# Patient Record
Sex: MALE | Race: ASIAN | NOT HISPANIC OR LATINO | Employment: OTHER | URBAN - METROPOLITAN AREA
[De-identification: names, ages, dates, MRNs, and addresses within clinical notes are randomized per-mention and may not be internally consistent; named-entity substitution may affect disease eponyms.]

---

## 2017-04-17 ENCOUNTER — HOSPITAL ENCOUNTER (OUTPATIENT)
Dept: LAB | Facility: MEDICAL CENTER | Age: 77
End: 2017-04-17
Attending: INTERNAL MEDICINE
Payer: MEDICARE

## 2017-04-17 ENCOUNTER — TELEPHONE (OUTPATIENT)
Dept: MEDICAL GROUP | Facility: MEDICAL CENTER | Age: 77
End: 2017-04-17

## 2017-04-17 ENCOUNTER — OFFICE VISIT (OUTPATIENT)
Dept: MEDICAL GROUP | Facility: MEDICAL CENTER | Age: 77
End: 2017-04-17
Payer: MEDICARE

## 2017-04-17 VITALS
SYSTOLIC BLOOD PRESSURE: 120 MMHG | OXYGEN SATURATION: 97 % | HEART RATE: 80 BPM | HEIGHT: 63 IN | DIASTOLIC BLOOD PRESSURE: 70 MMHG | WEIGHT: 149 LBS | TEMPERATURE: 97.7 F | BODY MASS INDEX: 26.4 KG/M2 | RESPIRATION RATE: 16 BRPM

## 2017-04-17 DIAGNOSIS — Z12.5 PROSTATE CANCER SCREENING: ICD-10-CM

## 2017-04-17 DIAGNOSIS — D64.9 ANEMIA, UNSPECIFIED TYPE: Chronic | ICD-10-CM

## 2017-04-17 DIAGNOSIS — E83.119 HEMOCHROMATOSIS, UNSPECIFIED HEMOCHROMATOSIS TYPE: ICD-10-CM

## 2017-04-17 DIAGNOSIS — E78.5 DYSLIPIDEMIA: Chronic | ICD-10-CM

## 2017-04-17 DIAGNOSIS — E11.9 DIABETES MELLITUS TYPE 2, DIET-CONTROLLED (HCC): Chronic | ICD-10-CM

## 2017-04-17 DIAGNOSIS — E83.19 IRON OVERLOAD: ICD-10-CM

## 2017-04-17 LAB
ALBUMIN SERPL BCP-MCNC: 4.3 G/DL (ref 3.2–4.9)
ALBUMIN/GLOB SERPL: 1.5 G/DL
ALP SERPL-CCNC: 65 U/L (ref 30–99)
ALT SERPL-CCNC: 17 U/L (ref 2–50)
ANION GAP SERPL CALC-SCNC: 6 MMOL/L (ref 0–11.9)
APPEARANCE UR: CLEAR
AST SERPL-CCNC: 18 U/L (ref 12–45)
BASOPHILS # BLD AUTO: 1.3 % (ref 0–1.8)
BASOPHILS # BLD: 0.06 K/UL (ref 0–0.12)
BILIRUB SERPL-MCNC: 1.6 MG/DL (ref 0.1–1.5)
BILIRUB UR QL STRIP.AUTO: NEGATIVE
BUN SERPL-MCNC: 20 MG/DL (ref 8–22)
CALCIUM SERPL-MCNC: 9.2 MG/DL (ref 8.5–10.5)
CHLORIDE SERPL-SCNC: 105 MMOL/L (ref 96–112)
CHOLEST SERPL-MCNC: 134 MG/DL (ref 100–199)
CO2 SERPL-SCNC: 29 MMOL/L (ref 20–33)
COLOR UR: NORMAL
CREAT SERPL-MCNC: 0.9 MG/DL (ref 0.5–1.4)
CREAT UR-MCNC: 68.5 MG/DL
CULTURE IF INDICATED INDCX: NO UA CULTURE
EOSINOPHIL # BLD AUTO: 0.07 K/UL (ref 0–0.51)
EOSINOPHIL NFR BLD: 1.5 % (ref 0–6.9)
ERYTHROCYTE [DISTWIDTH] IN BLOOD BY AUTOMATED COUNT: 40.9 FL (ref 35.9–50)
EST. AVERAGE GLUCOSE BLD GHB EST-MCNC: 160 MG/DL
FERRITIN SERPL-MCNC: 1441.8 NG/ML (ref 22–322)
FOLATE SERPL-MCNC: 10.8 NG/ML
GFR SERPL CREATININE-BSD FRML MDRD: >60 ML/MIN/1.73 M 2
GLOBULIN SER CALC-MCNC: 2.9 G/DL (ref 1.9–3.5)
GLUCOSE SERPL-MCNC: 179 MG/DL (ref 65–99)
GLUCOSE UR STRIP.AUTO-MCNC: NEGATIVE MG/DL
HBA1C MFR BLD: 7.2 % (ref 0–5.6)
HCT VFR BLD AUTO: 35.1 % (ref 42–52)
HDLC SERPL-MCNC: 60 MG/DL
HGB BLD-MCNC: 11.3 G/DL (ref 14–18)
IMM GRANULOCYTES # BLD AUTO: 0.01 K/UL (ref 0–0.11)
IMM GRANULOCYTES NFR BLD AUTO: 0.2 % (ref 0–0.9)
IRON SERPL-MCNC: 222 UG/DL (ref 50–180)
KETONES UR STRIP.AUTO-MCNC: NEGATIVE MG/DL
LDH SERPL-CCNC: 182 U/L (ref 107–266)
LDLC SERPL CALC-MCNC: 62 MG/DL
LEUKOCYTE ESTERASE UR QL STRIP.AUTO: NEGATIVE
LYMPHOCYTES # BLD AUTO: 1.69 K/UL (ref 1–4.8)
LYMPHOCYTES NFR BLD: 36.8 % (ref 22–41)
MCH RBC QN AUTO: 24 PG (ref 27–33)
MCHC RBC AUTO-ENTMCNC: 32.2 G/DL (ref 33.7–35.3)
MCV RBC AUTO: 74.5 FL (ref 81.4–97.8)
MICRO URNS: NORMAL
MICROALBUMIN UR-MCNC: 3.5 MG/DL
MICROALBUMIN/CREAT UR: 51 MG/G (ref 0–30)
MONOCYTES # BLD AUTO: 0.3 K/UL (ref 0–0.85)
MONOCYTES NFR BLD AUTO: 6.5 % (ref 0–13.4)
NEUTROPHILS # BLD AUTO: 2.46 K/UL (ref 1.82–7.42)
NEUTROPHILS NFR BLD: 53.7 % (ref 44–72)
NITRITE UR QL STRIP.AUTO: NEGATIVE
NRBC # BLD AUTO: 0 K/UL
NRBC BLD AUTO-RTO: 0 /100 WBC
PH UR STRIP.AUTO: 6 [PH]
PLATELET # BLD AUTO: 212 K/UL (ref 164–446)
PMV BLD AUTO: 10.7 FL (ref 9–12.9)
POTASSIUM SERPL-SCNC: 4.3 MMOL/L (ref 3.6–5.5)
PROT SERPL-MCNC: 7.2 G/DL (ref 6–8.2)
PROT UR QL STRIP: NEGATIVE MG/DL
PSA SERPL-MCNC: 1.66 NG/ML (ref 0–4)
RBC # BLD AUTO: 4.71 M/UL (ref 4.7–6.1)
RBC UR QL AUTO: NEGATIVE
SODIUM SERPL-SCNC: 140 MMOL/L (ref 135–145)
SP GR UR STRIP.AUTO: 1.01
TRIGL SERPL-MCNC: 61 MG/DL (ref 0–149)
TSH SERPL DL<=0.005 MIU/L-ACNC: 1.03 UIU/ML (ref 0.3–3.7)
VIT B12 SERPL-MCNC: 362 PG/ML (ref 211–911)
WBC # BLD AUTO: 4.6 K/UL (ref 4.8–10.8)

## 2017-04-17 PROCEDURE — 82728 ASSAY OF FERRITIN: CPT

## 2017-04-17 PROCEDURE — 83540 ASSAY OF IRON: CPT

## 2017-04-17 PROCEDURE — 82043 UR ALBUMIN QUANTITATIVE: CPT

## 2017-04-17 PROCEDURE — 83036 HEMOGLOBIN GLYCOSYLATED A1C: CPT

## 2017-04-17 PROCEDURE — 36415 COLL VENOUS BLD VENIPUNCTURE: CPT

## 2017-04-17 PROCEDURE — G8432 DEP SCR NOT DOC, RNG: HCPCS | Performed by: INTERNAL MEDICINE

## 2017-04-17 PROCEDURE — 1036F TOBACCO NON-USER: CPT | Performed by: INTERNAL MEDICINE

## 2017-04-17 PROCEDURE — 85025 COMPLETE CBC W/AUTO DIFF WBC: CPT

## 2017-04-17 PROCEDURE — 84153 ASSAY OF PSA TOTAL: CPT

## 2017-04-17 PROCEDURE — 82607 VITAMIN B-12: CPT

## 2017-04-17 PROCEDURE — 84443 ASSAY THYROID STIM HORMONE: CPT

## 2017-04-17 PROCEDURE — 99214 OFFICE O/P EST MOD 30 MIN: CPT | Performed by: INTERNAL MEDICINE

## 2017-04-17 PROCEDURE — 84165 PROTEIN E-PHORESIS SERUM: CPT

## 2017-04-17 PROCEDURE — 82570 ASSAY OF URINE CREATININE: CPT

## 2017-04-17 PROCEDURE — 80061 LIPID PANEL: CPT

## 2017-04-17 PROCEDURE — 83615 LACTATE (LD) (LDH) ENZYME: CPT

## 2017-04-17 PROCEDURE — G8417 CALC BMI ABV UP PARAM F/U: HCPCS | Performed by: INTERNAL MEDICINE

## 2017-04-17 PROCEDURE — 80053 COMPREHEN METABOLIC PANEL: CPT

## 2017-04-17 PROCEDURE — 81003 URINALYSIS AUTO W/O SCOPE: CPT

## 2017-04-17 PROCEDURE — 4040F PNEUMOC VAC/ADMIN/RCVD: CPT | Performed by: INTERNAL MEDICINE

## 2017-04-17 PROCEDURE — 1101F PT FALLS ASSESS-DOCD LE1/YR: CPT | Performed by: INTERNAL MEDICINE

## 2017-04-17 PROCEDURE — 84160 ASSAY OF PROTEIN ANY SOURCE: CPT

## 2017-04-17 PROCEDURE — 82746 ASSAY OF FOLIC ACID SERUM: CPT

## 2017-04-17 RX ORDER — PRAVASTATIN SODIUM 20 MG
10 TABLET ORAL DAILY
Qty: 90 TAB | Refills: 1 | Status: SHIPPED | OUTPATIENT
Start: 2017-04-17 | End: 2017-10-23

## 2017-04-17 NOTE — MR AVS SNAPSHOT
"        Jane Valentin   2017 9:20 AM   Office Visit   MRN: [id number]    Department:  [facility]   Dept Phone:  [phone]    Description:  Male : 1940   Provider:  Thomas Greenberg M.D.           Allergies as of 2017     Allergen Noted Reactions    Nkda [No Known Drug Allergy] 2010         You were diagnosed with     Diabetes mellitus type 2, diet-controlled (CMS-HCC)   [921220]       Dyslipidemia   [452328]       Anemia, unspecified type   [0440976]       Prostate cancer screening   [189209]       Iron overload   [565144]       Hemochromatosis, unspecified hemochromatosis type   [2822844]         Vital Signs     Blood Pressure Pulse Temperature Respirations Height Weight    120/70 mmHg 80 36.5 °C (97.7 °F) 16 1.6 m (5' 2.99\") 67.586 kg (149 lb)    Body Mass Index Oxygen Saturation Smoking Status             26.40 kg/m2 97% Former Smoker         Basic Information     Date Of Birth Sex Race Ethnicity Preferred Language    1940 Male  Non- English      Your appointments     Oct 23, 2017 11:00 AM   ANNUAL EXAM PREVENTATIVE with Thomas Greenberg M.D.   [facility])    [address]  [facility] [id number] [phone]              Problem List              ICD-10-CM Priority Class Noted - Resolved    Dyslipidemia (Chronic) E78.5 Low  6/3/2009 - Present    Anemia (Chronic) D64.9 Medium  6/3/2009 - Present    Preventative health care (Chronic) Z00.00   6/3/2009 - Present    Iron overload (Chronic)    2009 - Present    Diabetes mellitus type 2, diet-controlled (CMS-HCC) (Chronic) E11.9 Low  2009 - Present    BPH (benign prostatic hypertrophy) (Chronic) N40.0 Low  2011 - Present    MEDICAL HOME    Unknown - Present    Adenomatous colon polyp (Chronic) D12.6   2013 - Present    Arthritis of knee M19.90   2014 - Present    History of tobacco abuse Z87.891   2016 - Present      Health Maintenance    "       Date Due Completion Dates    URINE ACR / MICROALBUMIN 4/27/2016 4/27/2015, 1/27/2014    DIABETES MONOFILAMENT / LE EXAM 1/11/2017 1/11/2016, 2/26/2015 (Done), 1/27/2014 (Done)    Override on 2/26/2015: Done    Override on 1/27/2014: Done    A1C SCREENING 4/10/2017 10/10/2016, 6/6/2016, 1/11/2016, 10/26/2015, 4/27/2015, 11/18/2014, 6/2/2014, 1/27/2014, 4/1/2013, 10/29/2012, 7/23/2012, 4/16/2012, 7/26/2010    FASTING LIPID PROFILE 6/6/2017 6/6/2016, 1/11/2016, 10/26/2015, 4/27/2015, 1/27/2014, 10/29/2012, 7/26/2010, 6/4/2009    SERUM CREATININE 10/10/2017 10/10/2016, 9/10/2016, 9/9/2016, 6/6/2016, 1/11/2016, 10/26/2015, 4/27/2015, 11/18/2014, 7/30/2014, 7/29/2014, 7/21/2014, 6/2/2014, 1/27/2014, 4/1/2013, 10/29/2012, 7/23/2012, 4/16/2012, 9/12/2011, 6/20/2011, 11/23/2010, 7/26/2010, 8/25/2009, 6/4/2009    RETINAL SCREENING 12/5/2017 12/5/2016, 11/19/2014    COLONOSCOPY 6/25/2018 6/25/2015, 11/19/2014, 7/2/2014    IMM DTaP/Tdap/Td Vaccine (2 - Td) 7/23/2022 7/23/2012            Current Immunizations     13-VALENT PCV PREVNAR 10/28/2015    Hepatitis B Vaccine Recombivax (Adol/Adult) 10/10/2016    Influenza TIV (IM) 10/29/2012 11:44 AM    Influenza Vaccine Adult HD 10/10/2016, 10/26/2015, 11/17/2014, 1/27/2014    Influenza Vaccine Pediatric 11/22/2010    Pneumococcal polysaccharide vaccine (PPSV-23) 4/1/2013    SHINGLES VACCINE 10/29/2012 11:45 AM    Tdap Vaccine 7/23/2012      Below and/or attached are the medications your provider expects you to take. Review all of your home medications and newly ordered medications with your provider and/or pharmacist. Follow medication instructions as directed by your provider and/or pharmacist. Please keep your medication list with you and share with your provider. Update the information when medications are discontinued, doses are changed, or new medications (including over-the-counter products) are added; and carry medication information at all times in the event of  emergency situations     Allergies:  NKDA - (reactions not documented)               Medications  Valid as of: April 17, 2017 -  9:36 AM    Generic Name Brand Name Tablet Size Instructions for use    Aspirin (Chew Tab) ASA 81 MG Take 1 Tab by mouth every day.        MetFORMIN HCl (Tab) GLUCOPHAGE 500 MG Take 1 Tab by mouth every day.        Pravastatin Sodium (Tab) PRAVACHOL 10 MG Take 1 Tab by mouth every day.        Pravastatin Sodium (Tab) PRAVACHOL 20 MG Take 0.5 Tabs by mouth every day.        .                 Medicines prescribed today were sent to:     [id number] - [place], [place] - [address]    [address] [place] [address]    Phone: [phone] Fax: [phone]    Open 24 Hours?: No      Medication refill instructions:       If your prescription bottle indicates you have medication refills left, it is not necessary to call your provider’s office. Please contact your pharmacy and they will refill your medication.    If your prescription bottle indicates you do not have any refills left, you may request refills at any time through one of the following ways: The online [facility] system (except Urgent Care), by calling your provider’s office, or by asking your pharmacy to contact your provider’s office with a refill request. Medication refills are processed only during regular business hours and may not be available until the next business day. Your provider may request additional information or to have a follow-up visit with you prior to refilling your medication.   *Please Note: Medication refills are assigned a new Rx number when refilled electronically. Your pharmacy may indicate that no refills were authorized even though a new prescription for the same medication is available at the pharmacy. Please request the medicine by name with the pharmacy before contacting your provider for a refill.        Your To Do List     Future Labs/Procedures Complete By Expires    CBC WITH DIFFERENTIAL  As  directed 4/18/2018    COMP METABOLIC PANEL  As directed 4/18/2018    FERRITIN  As directed 4/18/2018    FOLATE  As directed 4/18/2018    HEMOGLOBIN A1C  As directed 4/18/2018    IRON  As directed 4/18/2018    LDH  As directed 4/18/2018    LIPID PROFILE  As directed 4/18/2018    MICROALBUMIN CREAT RATIO URINE  As directed 4/18/2018    PROSTATE SPECIFIC AG SCREENING  As directed 4/18/2018    SERUM PROTEIN ELECTROPHORESIS  As directed 4/18/2018    TSH  As directed 4/18/2018    URINALYSIS,CULTURE IF INDICATED  As directed 4/18/2018    VITAMIN B12  As directed 4/18/2018      Other Notes About Your Plan     Wife minor hinton ok to discuss all med issues with her  Declines phlebotomy,liver biopsy for poss hemochromatosis    11/16 repeat cbc,anemia workup,a1c and diabetes labs  Hep b second in series                     MyChart Access Code: Activation code not generated  Current MyChart Status: Active

## 2017-04-17 NOTE — PROGRESS NOTES
Subjective:      Jane Valentin is a 76 y.o. male who presents with  Follow up sugar        HPI     Patient history of diabetes, on metformin 500 mg once daily, refuses to check his blood sugars, has been keeping active, alcohol 3 drinks per day, does not smoke.  No history of retinopathy, neuropathy, nephropathy.  Dyslipidemia on pravastatin, declines ACE inhibitor therapy, no history of hypertension, baby aspirin daily.  Eye exam annually.  Recently had cataract surgery.  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  No chest pain, short of breath, cough, lightheadedness, syncope  Lower extremities no edema, no open sores, no history of diabetic foot infections  No numbness or tingling of feet  No difficulty with balance  History chronic anemia, no melena or hematochezia  Iron overload, has declined phlebotomy or GI evaluation  No arthritis pains of his back, knees, or hips, no history of CHF, no shortness of breath, no orthopnea          Current Outpatient Prescriptions   Medication Sig Dispense Refill   • aspirin (ASA) 81 MG Chew Tab chewable tablet Take 1 Tab by mouth every day. 100 Tab 11   • pravastatin (PRAVACHOL) 10 MG Tab Take 1 Tab by mouth every day. 90 Tab 3   • metformin (GLUCOPHAGE) 500 MG Tab Take 1 Tab by mouth 2 times a day, with meals. 60 Tab 6     No current facility-administered medications for this visit.     Patient Active Problem List    Diagnosis Date Noted   • Anemia 06/03/2009     Priority: Medium   • BPH (benign prostatic hypertrophy) 06/20/2011     Priority: Low   • Diabetes mellitus type 2, diet-controlled (CMS-[facility]) 09/01/2009     Priority: Low   • Dyslipidemia 06/03/2009     Priority: Low   • History of tobacco abuse 09/26/2016   • Arthritis of knee 06/02/2014   • Adenomatous colon polyp 04/02/2013   • MEDICAL HOME    • Iron overload 06/08/2009   • Preventative health care 06/03/2009       Preventative health  7/23/12 tdap  10/29/12 zoster  vaccine  4/1/13 pneumovax vaccine  1/27/14 psa 1.3  7/28/14  surgery procedure note robotic low anterior resection for unresectable lesion by colonoscopy  6/25/15 colon per [facility] anastomotic stricture 15 cm, estimated diameter 30 mm repeat 3 years  10/28/15 prevnar at [facility]  10/10/16 hep b first in series  10/10/16  Flu hd  10/14/16 FIT negative    Iron overload  6/09 iron 197,saturation 71%,ferritin 1556,normal hemoglobin hematocrit, normal AST, ALT. Patient is adopted no family history of hemochromatosis. We'll repeat labs plus hemochromatosis genetic testing one month, at this point I am considering recommending phlebotomy once every 2-3 months however his hematocrit is already at 39 which I think is reasonable given the fact that he has no other manifestations of disease such as hepatitis, diabetes, cardiomyopathy.  8/09 iron 202,%sat 86,ferritin 1805, AST 21,ALT 21  9/09 HH negative for C282Y,H63D,S65C  9/09 called patient recommend consider liver biopsy for definitive diagnosis hemochromatosis given negative genetic testing, we will discuss with him, other option would be to proceed with phlebotomy to get hemoglobin less than 12, although without a definitive diagnosis for iron stores liver or genetic testing for hemochromatosis this would not be an ideal treatment situation.  11/16/09 set up for phlebotomy had done 1x, refuses more, refuses liver bx  7/10 iron 231,ferritin 2198,%sat 95,TIBC 242; AST 25,ALT 30; hgb 12,hct 37  11/10 iron 178,ferritin 1997,%sat 74,AST 22,ALT 22,hgb 12.4,hct 37.5,  6/11 iron 180,ferritin 1479,%sat 65,AST 23,ALT 24,hgb 12.4,hct 39,  9/11 iron 113,ferritin 1423,%sat 42,AST 21,ALT 25,hgb 12.6,hct 37.6,; pt cont to decline liver bx, phlebotomy  4/12 iron 192,ferritin 1355,%sat 68,AST 25,ALT 30,hgb 12,hct 37.5; decline phlebotomy  7/12 iron 121,%sat 46,AST 22,ALT 21  10/12 iron 146,ferritin 1367,%sat 52,AST 25,ALT 26; declines phlebotomy  4/1/13 AST 20,ALT  23, ferritin 1312, %sat 45 iron 122, hgb 13,hct 39, mcv 75  1/27/14 AST 21,ALT 21, ferritin 1267, %sat 55, iron 157, hgb 11.8, hct 36, mcv 73  6/2/14 AST 20,ALT 20, ferritin 1275, %sat   Iron hgb 11.2, hct 33.6,mcv 73  11/18/14 AST 17,ALT 17,hgb 11.5,hct 36,mcv 75,iron 72,%sat 24,ferritin 1229  4/28/15 AST 21,ALT 20,hgb 11.2,hct 35,mcv 76,iron 178,%sat 67,ferritin 1316  10/28/15 iron 180,%sat 64,ferritin cancelled,AST 20,ALT 21,hemochromatosis C282Y negative, H63D negative, S56C negative  1/11/16 iron  6/6/16 hgb 11.3,hct 33.8,mcv 74,iron 132,%sat 45,ferritin 139,AST 21,ALT 18 21,%sat 8,ferritin 1414,hgb 11.4,hct 35,mcv 74,AST 19,ALT 19  9/10/16 AST 22,ALT 20,hgb 10.5,hct 31.6,mcv 75  10/11/16 AST 21,ALT 20,alk phos 66,bili 1.8    Dyslipidemia  4/08 chol 183,trig 114,hdl 66,ldl 94  7/10 chol 167,trig 99,hdl 50,ldl 97  10/12 chol 183,trig 67,hdl 51,ldl 119  1/27/14 chol 170,trig 53,hdl 55,ldl 104  4/28/15 chol 159,trig 77,hdl 52,ldl 92  10/28/15 chol 185,trig 68,hdl 59,ldl 112  1/11/16 chol 172,trig 62,hdl 61,ldl 99 start pravachol 10 mg (diabetes)  6/6/16 chol 158,trig 63,hdl 58,ldl 87 on pravachol 10 mg     Diabetes  8/09 blood sugar 126 likely related to hemochromatosis  7/10 bs 117,A1c 6.6%  11/10 bs 111  9/11 bs 125  4/12 A1c 6.5%,bs 142  7/12 A1c 6.3%,bs 122  10/12 A1c 6.9%,bs 132 pt declines fingerstick bs testing  4/1/13 A1c 6.6%; bs 137  pt declines fingerstick bs testing  1/27/14 A1c 6.7%, urine mac 30; declines to check his own blood sugar, declines diabetes education  6/2/14 A1c 6.3% no meds, declines to check blood sugar  11/18/14 A1c 6.9% no meds, declines to check blood sugars  11/19/14 start metformin 500 mg qday, patient declines checking blood sugars, repeat labs three months  11/19/14  eye exam cataract  2/26/15 not taking metformin, recommend take 500 mg daily  4/28/15 A1c 7.1%, bs 171,urine mac 18, not taking metformin  6/1/15 declines diabetes classes, ACE, statin, asa    6/29/15 resume  metformin 500 mg bid  10/19/15 diabetes education classes referral  10/28/15 A1c 6.8%, bs 141 on metformin 500 mg once per day, not checking sugars, diabetes classes pending  1/11/16 A1c 6.9% on metformin 500 mg qday, declines ACE, start pravachol 10 mg  6/6/16 A1c 6.6% on metformin 500 mg qday  10/11/16 A1c 7% on metformin 500 mg qday declines ACE, I recommend asa 81 mg  12/5/16  eye exam bilateral cataract, vitreous degeneration    BPH  6/20/11 trial of flomax  9/11 psa 1.2  10/12 flomax resume and start proscar  10/12 psa 1.4  4/1/13 off flomax and proscar    Arthritis left knee    Anemia  11/08 hgb 12.2,hct 35.6, mcv 73  6/09  Hgb 12.7, hct 39.2, mcv 77, normal B12 and folate, iron overload see note  7/10 hgb 12,hct 37  11/10 hgb 12.4,hct 37.5  9/11 hgb 12.6,hct 37.6,mcv 75  4/12 hgb 12,hct 37.5  7/12 hgb 11.9,hct 37,mcv 75  10/29/12 hgb 12.3,hct 36.2,mcv 73;iron 146,ferritin 1367,%sat 52,ESR 18, B12 383,folate 13; SPEP gamma globulin 1.6, no M-spike noted; FIT ordered  4/1/13 hgb 13,hct 39,mcv 75; iron 122,ferritin 1312,%sat 45  1/27/14 hgb 11.8,hct 36,mcv 73,iron 157,ferritin 1267,%sat 55; pt declines referral evaluate liver biopsy  6/2/14 hgb 11.2,hct 33.6,mcv 73,iron 165,%sat 63,ferritin 1275, b12 318, folate 11.7,adj retic 1.1%; SPEP negative  11/18/14 hgb 11.5,hct 36,mcv 75,iron 72,%sat 24,ferritin 1229  4/28/15 hgb 11.2,hct 35,mcv 96,iron 178,%sat 67,ferritin 1316  10/28/15 hgb 11.7,hct 36,mcv 74,iron 180,%sat 64  1/11/16 hgb 11.4,hct 35,mcv 74,iron 21,%sat 8,ferritin 1414,SPEP negative  6/25/15 colon per [facility] anastomotic stricture 15 cm, estimated diameter 30 mm repeat 3 years  6/6/16 hgb 11.3,hct 33.8,mcv 74,iron 132,%sat 45,ferritin 1392,b12 337,folate 12,,retic 1.8,SPEP negative  9/10/16 hgb 10.5,hct 31.6,mcv 75  10/11/16 hgb 10.9,hct 33,mcv 75,iron 135,%sat 43,ferritin 1457  10/14/16 FIT negative    Adenoma  9/26/07 colon per [facility] negative mixed adenoma polyp  7/2/14 colon per [facility]  tubular adenoma x 3 and tubulovillous adenoma, repeat colonoscopy 6 months  7/28/14  surgery procedure note robotic low anterior resection for unresectable lesion by colonoscopy  6/25/15 colon per [facility] anastomotic stricture 15 cm, estimated diameter 30 mm repeat 3 years        ROS       Objective:          Physical Exam   Constitutional: He appears well-developed and well-nourished. No distress.   HENT:   Head: Normocephalic and atraumatic.   Right Ear: External ear normal.   Left Ear: External ear normal.   Mouth/Throat: Oropharynx is clear and moist.   Eyes: Conjunctivae are normal. Right eye exhibits no discharge. Left eye exhibits no discharge. No scleral icterus.   Neck: Neck supple. No JVD present.   Cardiovascular: Normal rate and regular rhythm.    No murmur heard.  Pulmonary/Chest: No respiratory distress. He has no wheezes.   Abdominal: He exhibits no distension.   Musculoskeletal: He exhibits no edema.   Skin: He is not diaphoretic. No erythema.   Psychiatric: He has a normal mood and affect. His behavior is normal.   Nursing note and vitals reviewed.         Monofilament testing with a 10 gram force: sensation intact: Intact  Visual Inspection: Feet without maceration, ulcers, fissures.  Pedal pulses: 2/4 pedal pulses     Assessment/Plan:     Assessment  #!   Diabetes mellitus last A1c 7% in October, on metformin once daily, no GI side effects, no nephropathy, neuropathy, retinopathy, refuses to check blood sugars, declines diabetic education classes, recent eye exam negative for retinopathy, declines ACE inhibitor therapy, remains on aspirin, pravastatin    #2 dyslipidemia on Pravachol    #3 iron overload, has declined GI evaluation, declines phlebotomy, no history of CHF arthritis, normal liver enzymes    #4 chronic anemia, no blood in stools      Plan  #1 recheck labs    #2 continue metformin once daily, declines diabetic classes, education, declines to check blood sugars    #3 decrease  alcohol to one per day    #4 increase pravastatin 20 mg take half a day because of cost    #5 declines ACE inhibitor    #6 continue aspirin a day    #7 check feet daily, annual eye exam    #8 follow-up 6 months

## 2017-04-18 ENCOUNTER — TELEPHONE (OUTPATIENT)
Dept: MEDICAL GROUP | Facility: MEDICAL CENTER | Age: 77
End: 2017-04-18

## 2017-04-18 NOTE — TELEPHONE ENCOUNTER
----- Message from Thomas Greenberg M.D. sent at 4/17/2017  7:03 PM PDT -----  Please contact wife minor hinton [phone]  Let her know that her 's test results show:  (1) his blood counts are still slightly low, but have improved since the last test  (2) the diabetes test is slightly worse than previous, he can continue on one pill of metformin daily, but he needs to decrease his alcohol intake as we discussed  (3) his iron levels are still high, I would still recommend seeing the liver doctor for potential liver biopsy, if not we can repeat his blood test in 3-4 months  (4) his cholesterol is excellent at 134, his liver function, kidney function, thyroid, urine test, prostate cancer blood tests are normal  (5) I would like to repeat his blood test in 3-4 months

## 2017-04-18 NOTE — TELEPHONE ENCOUNTER
Informed wife of message below. She is going to discuss results with  and gives us a call back to see what he wants to do in regards to the liver biopsy.

## 2017-04-18 NOTE — TELEPHONE ENCOUNTER
Please contact wife minor hinton [phone]  Let her know that her 's test results show:  (1) his blood counts are still slightly low, but have improved since the last test  (2) the diabetes test is slightly worse than previous, he can continue on one pill of metformin daily, but he needs to decrease his alcohol intake as we discussed  (3) his iron levels are still high, I would still recommend seeing the liver doctor for potential liver biopsy, if not we can repeat his blood test in 3-4 months  (4) his cholesterol is excellent at 134, his liver function, kidney function, thyroid, urine test, prostate cancer blood tests are normal  (5) I would like to repeat his blood test in 3-4 months

## 2017-04-19 LAB
ALBUMIN SERPL-MCNC: 4.79 G/DL (ref 3.75–5.01)
ALPHA1 GLOB SERPL ELPH-MCNC: 0.21 G/DL (ref 0.19–0.46)
ALPHA2 GLOB SERPL ELPH-MCNC: 0.52 G/DL (ref 0.48–1.05)
B-GLOBULIN SERPL ELPH-MCNC: 0.71 G/DL (ref 0.48–1.1)
EER PROT ELECT SER Q1092: NORMAL
GAMMA GLOB SERPL ELPH-MCNC: 1.18 G/DL (ref 0.62–1.51)
INTERPRETATION SERPL IFE-IMP: NORMAL
PROT SERPL-MCNC: 7.4 G/DL (ref 6–8.3)

## 2017-09-22 ENCOUNTER — PATIENT MESSAGE (OUTPATIENT)
Dept: HEALTH INFORMATION MANAGEMENT | Facility: OTHER | Age: 77
End: 2017-09-22

## 2017-10-23 ENCOUNTER — OFFICE VISIT (OUTPATIENT)
Dept: MEDICAL GROUP | Facility: MEDICAL CENTER | Age: 77
End: 2017-10-23
Payer: MEDICARE

## 2017-10-23 ENCOUNTER — HOSPITAL ENCOUNTER (OUTPATIENT)
Dept: LAB | Facility: MEDICAL CENTER | Age: 77
End: 2017-10-23
Attending: INTERNAL MEDICINE
Payer: MEDICARE

## 2017-10-23 VITALS
WEIGHT: 147.8 LBS | TEMPERATURE: 97.4 F | DIASTOLIC BLOOD PRESSURE: 76 MMHG | HEART RATE: 100 BPM | HEIGHT: 63 IN | OXYGEN SATURATION: 96 % | SYSTOLIC BLOOD PRESSURE: 150 MMHG | BODY MASS INDEX: 26.19 KG/M2

## 2017-10-23 DIAGNOSIS — E78.5 DYSLIPIDEMIA: Chronic | ICD-10-CM

## 2017-10-23 DIAGNOSIS — E11.9 DIABETES MELLITUS TYPE 2, DIET-CONTROLLED (HCC): Chronic | ICD-10-CM

## 2017-10-23 DIAGNOSIS — E83.19 IRON OVERLOAD: Chronic | ICD-10-CM

## 2017-10-23 DIAGNOSIS — Z00.00 MEDICARE ANNUAL WELLNESS VISIT, SUBSEQUENT: ICD-10-CM

## 2017-10-23 DIAGNOSIS — D64.9 ANEMIA, UNSPECIFIED TYPE: Chronic | ICD-10-CM

## 2017-10-23 DIAGNOSIS — D12.5 ADENOMATOUS POLYP OF SIGMOID COLON: Chronic | ICD-10-CM

## 2017-10-23 LAB
ALBUMIN SERPL BCP-MCNC: 4.6 G/DL (ref 3.2–4.9)
ALBUMIN/GLOB SERPL: 1.6 G/DL
ALP SERPL-CCNC: 62 U/L (ref 30–99)
ALT SERPL-CCNC: 17 U/L (ref 2–50)
ANION GAP SERPL CALC-SCNC: 6 MMOL/L (ref 0–11.9)
AST SERPL-CCNC: 20 U/L (ref 12–45)
BASOPHILS # BLD AUTO: 0.9 % (ref 0–1.8)
BASOPHILS # BLD: 0.05 K/UL (ref 0–0.12)
BILIRUB SERPL-MCNC: 1.7 MG/DL (ref 0.1–1.5)
BUN SERPL-MCNC: 20 MG/DL (ref 8–22)
CALCIUM SERPL-MCNC: 9.4 MG/DL (ref 8.5–10.5)
CHLORIDE SERPL-SCNC: 105 MMOL/L (ref 96–112)
CHOLEST SERPL-MCNC: 130 MG/DL (ref 100–199)
CO2 SERPL-SCNC: 28 MMOL/L (ref 20–33)
CREAT SERPL-MCNC: 0.83 MG/DL (ref 0.5–1.4)
EOSINOPHIL # BLD AUTO: 0.06 K/UL (ref 0–0.51)
EOSINOPHIL NFR BLD: 1.1 % (ref 0–6.9)
ERYTHROCYTE [DISTWIDTH] IN BLOOD BY AUTOMATED COUNT: 40 FL (ref 35.9–50)
EST. AVERAGE GLUCOSE BLD GHB EST-MCNC: 160 MG/DL
FERRITIN SERPL-MCNC: 1361.3 NG/ML (ref 22–322)
GFR SERPL CREATININE-BSD FRML MDRD: >60 ML/MIN/1.73 M 2
GLOBULIN SER CALC-MCNC: 2.8 G/DL (ref 1.9–3.5)
GLUCOSE SERPL-MCNC: 163 MG/DL (ref 65–99)
HBA1C MFR BLD: 7.2 % (ref 0–5.6)
HCT VFR BLD AUTO: 36.1 % (ref 42–52)
HDLC SERPL-MCNC: 59 MG/DL
HGB BLD-MCNC: 11.6 G/DL (ref 14–18)
IMM GRANULOCYTES # BLD AUTO: 0 K/UL (ref 0–0.11)
IMM GRANULOCYTES NFR BLD AUTO: 0 % (ref 0–0.9)
IRON SATN MFR SERPL: 63 % (ref 15–55)
IRON SERPL-MCNC: 187 UG/DL (ref 50–180)
LDLC SERPL CALC-MCNC: 60 MG/DL
LYMPHOCYTES # BLD AUTO: 2.02 K/UL (ref 1–4.8)
LYMPHOCYTES NFR BLD: 38 % (ref 22–41)
MCH RBC QN AUTO: 24.1 PG (ref 27–33)
MCHC RBC AUTO-ENTMCNC: 32.1 G/DL (ref 33.7–35.3)
MCV RBC AUTO: 74.9 FL (ref 81.4–97.8)
MONOCYTES # BLD AUTO: 0.38 K/UL (ref 0–0.85)
MONOCYTES NFR BLD AUTO: 7.1 % (ref 0–13.4)
NEUTROPHILS # BLD AUTO: 2.81 K/UL (ref 1.82–7.42)
NEUTROPHILS NFR BLD: 52.9 % (ref 44–72)
NRBC # BLD AUTO: 0 K/UL
NRBC BLD AUTO-RTO: 0 /100 WBC
PLATELET # BLD AUTO: 206 K/UL (ref 164–446)
PMV BLD AUTO: 11 FL (ref 9–12.9)
POTASSIUM SERPL-SCNC: 4.3 MMOL/L (ref 3.6–5.5)
PROT SERPL-MCNC: 7.4 G/DL (ref 6–8.2)
RBC # BLD AUTO: 4.82 M/UL (ref 4.7–6.1)
SODIUM SERPL-SCNC: 139 MMOL/L (ref 135–145)
TIBC SERPL-MCNC: 297 UG/DL (ref 250–450)
TRIGL SERPL-MCNC: 57 MG/DL (ref 0–149)
WBC # BLD AUTO: 5.3 K/UL (ref 4.8–10.8)

## 2017-10-23 PROCEDURE — 83540 ASSAY OF IRON: CPT

## 2017-10-23 PROCEDURE — G0439 PPPS, SUBSEQ VISIT: HCPCS | Performed by: INTERNAL MEDICINE

## 2017-10-23 PROCEDURE — 85025 COMPLETE CBC W/AUTO DIFF WBC: CPT

## 2017-10-23 PROCEDURE — 82728 ASSAY OF FERRITIN: CPT

## 2017-10-23 PROCEDURE — 80061 LIPID PANEL: CPT

## 2017-10-23 PROCEDURE — 36415 COLL VENOUS BLD VENIPUNCTURE: CPT

## 2017-10-23 PROCEDURE — 83550 IRON BINDING TEST: CPT

## 2017-10-23 PROCEDURE — 80053 COMPREHEN METABOLIC PANEL: CPT

## 2017-10-23 PROCEDURE — 83036 HEMOGLOBIN GLYCOSYLATED A1C: CPT

## 2017-10-23 RX ORDER — AZITHROMYCIN 250 MG/1
TABLET, FILM COATED ORAL
Qty: 6 TAB | Refills: 0 | Status: SHIPPED | OUTPATIENT
Start: 2017-10-23 | End: 2018-02-26

## 2017-10-23 ASSESSMENT — PATIENT HEALTH QUESTIONNAIRE - PHQ9: CLINICAL INTERPRETATION OF PHQ2 SCORE: 0

## 2017-10-23 NOTE — PROGRESS NOTES
Chief Complaint   Patient presents with   • Annual Wellness Visit         HPI:  Jane is a 77 y.o. 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, patient is , no tobacco, alcohol 2 beers per day  No sodas  Checks blood sugar infrequently typically was 120, checking once every week or so  Has had recent eye exam, cataract surgery, no foot sores or lesions or ulcers        Patient Active Problem List    Diagnosis Date Noted   • Anemia 06/03/2009     Priority: Medium   • BPH (benign prostatic hypertrophy) 06/20/2011     Priority: Low   • Diabetes mellitus type 2, diet-controlled (CMS-[facility]) 09/01/2009     Priority: Low   • Dyslipidemia 06/03/2009     Priority: Low   • History of tobacco abuse 09/26/2016   • Arthritis of knee 06/02/2014   • Adenomatous colon polyp 04/02/2013   • MEDICAL HOME    • Iron overload 06/08/2009   • Preventative health care 06/03/2009       Current Outpatient Prescriptions   Medication Sig Dispense Refill   • metformin (GLUCOPHAGE) 500 MG Tab Take 1 Tab by mouth every day. 90 Tab 1   • pravastatin (PRAVACHOL) 10 MG Tab Take 1 Tab by mouth every day. 90 Tab 3   • pravastatin (PRAVACHOL) 20 MG Tab Take 0.5 Tabs by mouth every day. (Patient not taking: Reported on 10/23/2017) 90 Tab 1   • aspirin (ASA) 81 MG Chew Tab chewable tablet Take 1 Tab by mouth every day. 100 Tab 11     No current facility-administered medications for this visit.         Patient is taking medications as noted in medication list.  Current supplements as per medication list.     Allergies: Nkda [no known drug allergy]    Current social contact/activities: Pt is currently working 72 hours weekly.      Is patient current with immunizations? No, due for FLU. Patient is interested in receiving NONE today.    He  reports that he quit smoking about 21  years ago. His smoking use included Cigarettes. He has a 12.50 pack-year smoking history. He has never used smokeless tobacco. He reports that he drinks about 9.6 oz of alcohol per week . He reports that he does not use drugs.  Counseling given: Not Answered      Preventative health  7/23/12 tdap  10/29/12 zoster vaccine  4/1/13 pneumovax vaccine  7/28/14  surgery procedure note robotic low anterior resection for unresectable lesion by colonoscopy  6/25/15 colon per [facility] anastomotic stricture 15 cm, estimated diameter 30 mm repeat 3 years  10/28/15 prevnar at [facility]  10/10/16 hep b first in series  10/14/16 FIT negative  4/17/17 psa 1.6     Iron overload  6/09 iron 197,saturation 71%,ferritin 1556,normal hemoglobin hematocrit, normal AST, ALT. Patient is adopted no family history of hemochromatosis. We'll repeat labs plus hemochromatosis genetic testing one month, at this point I am considering recommending phlebotomy once every 2-3 months however his hematocrit is already at 39 which I think is reasonable given the fact that he has no other manifestations of disease such as hepatitis, diabetes, cardiomyopathy.  8/09 iron 202,%sat 86,ferritin 1805, AST 21,ALT 21  9/09 HH negative for C282Y,H63D,S65C  9/09 called patient recommend consider liver biopsy for definitive diagnosis hemochromatosis given negative genetic testing, we will discuss with him, other option would be to proceed with phlebotomy to get hemoglobin less than 12, although without a definitive diagnosis for iron stores liver or genetic testing for hemochromatosis this would not be an ideal treatment situation.  11/16/09 set up for phlebotomy had done 1x, refuses more, refuses liver bx  7/10 iron 231,ferritin 2198,%sat 95,TIBC 242; AST 25,ALT 30; hgb 12,hct 37  11/10 iron 178,ferritin 1997,%sat 74,AST 22,ALT 22,hgb 12.4,hct 37.5,  6/11 iron 180,ferritin 1479,%sat 65,AST 23,ALT 24,hgb 12.4,hct 39,  9/11 iron 113,ferritin 1423,%sat  42,AST 21,ALT 25,hgb 12.6,hct 37.6,; pt cont to decline liver bx, phlebotomy  4/12 iron 192,ferritin 1355,%sat 68,AST 25,ALT 30,hgb 12,hct 37.5; decline phlebotomy  7/12 iron 121,%sat 46,AST 22,ALT 21  10/12 iron 146,ferritin 1367,%sat 52,AST 25,ALT 26; declines phlebotomy  4/1/13 AST 20,ALT 23, ferritin 1312, %sat 45 iron 122, hgb 13,hct 39, mcv 75  1/27/14 AST 21,ALT 21, ferritin 1267, %sat 55, iron 157, hgb 11.8, hct 36, mcv 73  6/2/14 AST 20,ALT 20, ferritin 1275, %sat   Iron hgb 11.2, hct 33.6,mcv 73  11/18/14 AST 17,ALT 17,hgb 11.5,hct 36,mcv 75,iron 72,%sat 24,ferritin 1229  4/28/15 AST 21,ALT 20,hgb 11.2,hct 35,mcv 76,iron 178,%sat 67,ferritin 1316  10/28/15 iron 180,%sat 64,ferritin cancelled,AST 20,ALT 21,hemochromatosis C282Y negative, H63D negative, S56C negative  1/11/16 iron  6/6/16 hgb 11.3,hct 33.8,mcv 74,iron 132,%sat 45,ferritin 139,AST 21,ALT 18 21,%sat 8,ferritin 1414,hgb 11.4,hct 35,mcv 74,AST 19,ALT 19  9/10/16 AST 22,ALT 20,hgb 10.5,hct 31.6,mcv 75  10/11/16 AST 21,ALT 20,alk phos 66,bili 1.8  4/17/17 AST 18,ALT 17,iron 222,ferritin 1441,,hgb 11.3,hct 35,mcv 74     Dyslipidemia  4/08 chol 183,trig 114,hdl 66,ldl 94  7/10 chol 167,trig 99,hdl 50,ldl 97  10/12 chol 183,trig 67,hdl 51,ldl 119  1/27/14 chol 170,trig 53,hdl 55,ldl 104  4/28/15 chol 159,trig 77,hdl 52,ldl 92  10/28/15 chol 185,trig 68,hdl 59,ldl 112  1/11/16 chol 172,trig 62,hdl 61,ldl 99 start pravachol 10 mg (diabetes)  6/6/16 chol 158,trig 63,hdl 58,ldl 87 on pravachol 10 mg  4/17/17 change to pravachol 20 mg 1/2 per day  4/17/17 chol 134,trig 61,hdl 60,ldl 62 on pravachol 20 mg take 1/2 per day     Diabetes  8/09 blood sugar 126 likely related to hemochromatosis  7/10 bs 117,A1c 6.6%  11/10 bs 111  9/11 bs 125  4/12 A1c 6.5%,bs 142  7/12 A1c 6.3%,bs 122  10/12 A1c 6.9%,bs 132 pt declines fingerstick bs testing  4/1/13 A1c 6.6%; bs 137  pt declines fingerstick bs testing  1/27/14 A1c 6.7%, urine mac 30; declines to  check his own blood sugar, declines diabetes education  6/2/14 A1c 6.3% no meds, declines to check blood sugar  11/18/14 A1c 6.9% no meds, declines to check blood sugars  11/19/14 start metformin 500 mg qday, patient declines checking blood sugars, repeat labs three months  11/19/14  eye exam cataract  2/26/15 not taking metformin, recommend take 500 mg daily  4/28/15 A1c 7.1%, bs 171,urine mac 18, not taking metformin  6/1/15 declines diabetes classes, ACE, statin, asa    6/29/15 resume metformin 500 mg bid  10/19/15 diabetes education classes referral  10/28/15 A1c 6.8%, bs 141 on metformin 500 mg once per day, not checking sugars, diabetes classes pending  1/11/16 A1c 6.9% on metformin 500 mg qday, declines ACE, start pravachol 10 mg  6/6/16 A1c 6.6% on metformin 500 mg qday  10/11/16 A1c 7% on metformin 500 mg qday declines ACE, I recommend asa 81 mg  12/5/16  eye exam bilateral cataract, vitreous degeneration  4/17/17 A1c 7.2% on metformin 500 mg qday declines diabetic classes, education, check blood sugars, and ACE inhibitor; remains on metformin 500 mg daily     BPH  6/20/11 trial of flomax  9/11 psa 1.2  10/12 flomax resume and start proscar  10/12 psa 1.4  4/1/13 off flomax and proscar     Arthritis left knee     Anemia  11/08 hgb 12.2,hct 35.6, mcv 73  6/09  Hgb 12.7, hct 39.2, mcv 77, normal B12 and folate, iron overload see note  7/10 hgb 12,hct 37  11/10 hgb 12.4,hct 37.5  9/11 hgb 12.6,hct 37.6,mcv 75  4/12 hgb 12,hct 37.5  7/12 hgb 11.9,hct 37,mcv 75  10/29/12 hgb 12.3,hct 36.2,mcv 73;iron 146,ferritin 1367,%sat 52,ESR 18, B12 383,folate 13; SPEP gamma globulin 1.6, no M-spike noted; FIT ordered  4/1/13 hgb 13,hct 39,mcv 75; iron 122,ferritin 1312,%sat 45  1/27/14 hgb 11.8,hct 36,mcv 73,iron 157,ferritin 1267,%sat 55; pt declines referral evaluate liver biopsy  6/2/14 hgb 11.2,hct 33.6,mcv 73,iron 165,%sat 63,ferritin 1275, b12 318, folate 11.7,adj retic 1.1%; SPEP negative  11/18/14  hgb 11.5,hct 36,mcv 75,iron 72,%sat 24,ferritin 1229  4/28/15 hgb 11.2,hct 35,mcv 96,iron 178,%sat 67,ferritin 1316  10/28/15 hgb 11.7,hct 36,mcv 74,iron 180,%sat 64  1/11/16 hgb 11.4,hct 35,mcv 74,iron 21,%sat 8,ferritin 1414,SPEP negative  6/25/15 colon per [facility] anastomotic stricture 15 cm, estimated diameter 30 mm repeat 3 years  6/6/16 hgb 11.3,hct 33.8,mcv 74,iron 132,%sat 45,ferritin 1392,b12 337,folate 12,,retic 1.8,SPEP negative  9/10/16 hgb 10.5,hct 31.6,mcv 75  10/11/16 hgb 10.9,hct 33,mcv 75,iron 135,%sat 43,ferritin 1457  10/14/16 FIT negative  4/17/17 hgb 11.3,hct 35,mcv 74.5,iron 222,ferrritin 1441,,b12 362,folate 11,SPEP negative     Adenoma  9/26/07 colon per [facility] negative mixed adenoma polyp  7/2/14 colon per [facility] tubular adenoma x 3 and tubulovillous adenoma, repeat colonoscopy 6 months  7/28/14  surgery procedure note robotic low anterior resection for unresectable lesion by colonoscopy  6/25/15 colon per [facility] anastomotic stricture 15 cm, estimated diameter 30 mm repeat 3 years              DPA/Advanced directive: Pt is not interested at the time.    ROS:    Gait: Uses no assistive devic   Ostomy: no   Other tubes: no    Amputations: no    Chronic oxygen use no   Last eye exam Summer 2017    Wears hearing aids: no   : Denies incontinence.       Screening:    DIABETES    Has patient ever had diabetes education? Yes, and is NOT interested in more at this time.            Depression Screening    Little interest or pleasure in doing things?  0 - not at all  Feeling down, depressed, or hopeless? 0 - not at all  Patient Health Questionnaire Score: 0    If depressive symptoms identified deferred to follow up visit unless specifically addressed in assessment and plan.    Interpretation of PHQ-9 Total Score   Score Severity   1-4 No Depression   5-9 Mild Depression   10-14 Moderate Depression   15-19 Moderately Severe Depression   20-27 Severe Depression    Screening for Cognitive  Impairment    Three Minute Recall (apple, watch, billy)  1/3  Language barrier  Draw clock face with all 12 numbers set to the hand to show 10 minutes past 11 o'clock  0 2/5  If cognitive concerns identified, deferred for follow up unless specifically addressed in assessment and plan.    Fall Risk Assessment    Has the patient had two or more falls in the last year or any fall with injury in the last year?  No  If fall risk identified, deferred for follow up unless specifically addressed in assessment and plan.    Safety Assessment    Throw rugs on floor.  Yes  Handrails on all stairs.  No  Good lighting in all hallways.  Yes  Difficulty hearing.  No  Patient counseled about all safety risks that were identified.    Functional Assessment ADLs    Are there any barriers preventing you from cooking for yourself or meeting nutritional needs?  No.    Are there any barriers preventing you from driving safely or obtaining transportation?  No.    Are there any barriers preventing you from using a telephone or calling for help?  No.    Are there any barriers preventing you from shopping?  No.    Are there any barriers preventing you from taking care of your own finances?  No.    Are there any barriers preventing you from managing your medications?  No.    Are you currently engaging any exercise or physical activity?  No.       Health Maintenance Summary                IMM INFLUENZA Overdue 9/1/2017      Done 10/10/2016 Imm Admin: Influenza Vaccine Adult HD     Patient has more history with this topic...    Annual Wellness Visit Overdue 10/11/2017      Done 10/10/2016      Patient has more history with this topic...    A1C SCREENING Overdue 10/17/2017      Done 4/17/2017 HEMOGLOBIN A1C (A)     Patient has more history with this topic...    RETINAL SCREENING Next Due 12/5/2017      Done 12/5/2016 REFERRAL FOR RETINAL SCREENING EXAM     Patient has more history with this topic...    FASTING LIPID PROFILE Next Due 4/17/2018       Done 4/17/2017 LIPID PROFILE     Patient has more history with this topic...    URINE ACR / MICROALBUMIN Next Due 4/17/2018      Done 4/17/2017 MICROALBUMIN CREAT RATIO URINE (A)     Patient has more history with this topic...    SERUM CREATININE Next Due 4/17/2018      Done 4/17/2017 COMP METABOLIC PANEL (A)     Patient has more history with this topic...    DIABETES MONOFILAMENT / LE EXAM Next Due 4/17/2018      Done 4/17/2017 AMB DIABETIC MONOFILAMENT LOWER EXTREMITY EXAM     Patient has more history with this topic...    COLONOSCOPY Next Due 6/25/2018      Done 6/25/2015 AMB REFERRAL TO GI FOR COLONOSCOPY     Patient has more history with this topic...    IMM DTaP/Tdap/Td Vaccine Next Due 7/23/2022      Done 7/23/2012 Imm Admin: Tdap Vaccine          Patient Care Team:  Thomas Greenberg M.D. as PCP - General  Mariana Adhikari O.D. as Consulting Physician (Optometry)  River FIGUEROA M.D. (Inactive) as Consulting Physician (Gastroenterology)  Massimo Zamudio M.D. as Consulting Physician (Surgery)    Social History   Substance Use Topics   • Smoking status: Former Smoker     Packs/day: 0.50     Years: 25.00     Types: Cigarettes     Quit date: 1/1/1996   • Smokeless tobacco: Never Used      Comment: quit in 1996   • Alcohol use 9.6 oz/week     2 Cans of beer, 14 Standard drinks or equivalent per week      Comment: 2 cans a beer a day      History reviewed. No pertinent family history.  He  has a past medical history of Anemia (6/3/2009); Arthritis; Dental disorder; Dyslipidemia (6/3/2009); Erectile dysfunction (6/3/2009); Impaired fasting blood sugar (9/1/2009); Iron overload (6/8/2009); MEDICAL HOME; and Preventative health care (6/3/2009).   Past Surgical History:   Procedure Laterality Date   • LOW ANTERIOR RESECTION ROBOTIC  7/28/2014    Performed by Massimo Zamudio M.D. at SURGERY [facility]   • OTHER  7/21/14    hernia repair           Exam:     There were no vitals taken for this visit. There is no  height or weight on file to calculate BMI.    Hearing no changes  Dentition fair  Alert, oriented in no acute distress.  Eye contact is good, speech goal directed, affect calm  Lungs clear  Cv s1s2 reg     Assessment and Plan. The following treatment and monitoring plan is recommended:     Assessment  #1 wellness    #2 Iron overload patient declined phlebotomy and liver biopsy 4/17/17 AST 18,ALT 17,iron 222,ferritin 1441,,hgb 11.3,hct 35,mcv 74    #3 Dyslipidemia stable on pravastatin 4/17/17 chol 134,trig 61,hdl 60,ldl 62 on pravachol 20 mg take 1/2 per day, no muscle pain     #4 Diabetes on metformin, declined diabetic classes, checks blood sugars occasionally, no side effects of metformin, no history of nephropathy, neuropathy, retinopathy, most recent lab 4/17/17 A1c 7.2% on metformin 500 mg qday declines diabetic classes, education, check blood sugars, and ACE inhibitor; remains on metformin 500 mg daily     #5 Arthritis left knee no falls     #6 Anemia 4/17/17 hgb 11.3,hct 35,mcv 74.5,iron 222,ferrritin 1441,,b12 362,folate 11,SPEP negative     #7 Adenoma 6/25/15 colon per [facility] anastomotic stricture 15 cm, estimated diameter 30 mm repeat 3 years     #8 preventative health  7/23/12 tdap  10/29/12 zoster vaccine  4/1/13 pneumovax vaccine  7/28/14  surgery procedure note robotic low anterior resection for unresectable lesion by colonoscopy  6/25/15 colon per [facility] anastomotic stricture 15 cm, estimated diameter 30 mm repeat 3 years  10/28/15 prevnar at [facility]  10/10/16 hep b first in series  10/14/16 FIT negative  4/17/17 psa 1.6          Services suggested:    Health Care Screening recommendations as per orders if indicated.  Referrals offered: PT/OT/Nutrition counseling/Behavioral Health/Smoking cessation as per orders if indicated.    Discussion today about general wellness and lifestyle habits:    · Prevent falls and reduce trip hazards; Cautioned about securing or removing rugs.  · Have a  working fire alarm and carbon monoxide detector;   · Engage in regular physical activity and social activities       Follow-up:    Plan  #1 nutrition, diet, exercise discussed, recommend alcohol cessation, no sodas, alcohol increased risk for liver disease, cardiovascular disease, worsening diabetes     #2 labs     #3 health maintenance reviewed and updated    #4 has had pneumococcal 13, pneumococcal 23, tetanus, shingles    #5 check blood sugar daily and record, declines diabetic classes, annual ophthalmologic exam, check feet daily    #6 declines evaluation for hemochromatosis with liver biopsy    #7 nutrition, diet, exercise discussed    #8 Zithromax for upcoming travel take as needed    #9 recommend annual influenza vaccine at pharmacy, we are out    #10 follow-up 4 months

## 2017-10-24 ENCOUNTER — TELEPHONE (OUTPATIENT)
Dept: MEDICAL GROUP | Facility: MEDICAL CENTER | Age: 77
End: 2017-10-24

## 2017-10-25 NOTE — TELEPHONE ENCOUNTER
Notified wife with results, she will relate to , A1c stable, continue work on limiting sweets, candies, sodas, recommended no alcohol, likely hemochromatosis, patient has declined liver biopsy and phlebotomy, stable numbers, repeat labs 3 months

## 2017-12-18 DIAGNOSIS — E11.9 DIABETES MELLITUS TYPE 2, DIET-CONTROLLED (HCC): Chronic | ICD-10-CM

## 2018-02-21 ENCOUNTER — TELEPHONE (OUTPATIENT)
Dept: MEDICAL GROUP | Facility: MEDICAL CENTER | Age: 78
End: 2018-02-21

## 2018-02-21 DIAGNOSIS — E78.5 DYSLIPIDEMIA: Chronic | ICD-10-CM

## 2018-02-21 DIAGNOSIS — E83.19 IRON OVERLOAD: Chronic | ICD-10-CM

## 2018-02-21 DIAGNOSIS — D64.9 ANEMIA, UNSPECIFIED TYPE: Chronic | ICD-10-CM

## 2018-02-21 DIAGNOSIS — E11.9 DIABETES MELLITUS TYPE 2, DIET-CONTROLLED (HCC): Chronic | ICD-10-CM

## 2018-02-21 NOTE — TELEPHONE ENCOUNTER
1. Caller Name: Pt                       Call Back Number: [phone] (home)     2. Message: Pt left message requesting lab orders to be placed.     3. Patient approves office to leave a detailed voicemail/MyChart message: N\A

## 2018-02-26 ENCOUNTER — HOSPITAL ENCOUNTER (OUTPATIENT)
Dept: LAB | Facility: MEDICAL CENTER | Age: 78
End: 2018-02-26
Attending: INTERNAL MEDICINE
Payer: MEDICARE

## 2018-02-26 ENCOUNTER — OFFICE VISIT (OUTPATIENT)
Dept: MEDICAL GROUP | Facility: MEDICAL CENTER | Age: 78
End: 2018-02-26
Payer: MEDICARE

## 2018-02-26 VITALS
TEMPERATURE: 97.4 F | OXYGEN SATURATION: 99 % | DIASTOLIC BLOOD PRESSURE: 62 MMHG | SYSTOLIC BLOOD PRESSURE: 144 MMHG | BODY MASS INDEX: 25.87 KG/M2 | HEIGHT: 63 IN | HEART RATE: 87 BPM | WEIGHT: 146 LBS

## 2018-02-26 DIAGNOSIS — E11.9 DIABETIC EYE EXAM (HCC): ICD-10-CM

## 2018-02-26 DIAGNOSIS — Z28.39 HEPATITIS B VACCINATION NOT UP TO DATE: ICD-10-CM

## 2018-02-26 DIAGNOSIS — E78.5 DYSLIPIDEMIA: Chronic | ICD-10-CM

## 2018-02-26 DIAGNOSIS — E11.9 DIABETES MELLITUS TYPE 2, DIET-CONTROLLED (HCC): Chronic | ICD-10-CM

## 2018-02-26 DIAGNOSIS — Z01.00 DIABETIC EYE EXAM (HCC): ICD-10-CM

## 2018-02-26 DIAGNOSIS — E83.19 IRON OVERLOAD: Chronic | ICD-10-CM

## 2018-02-26 LAB
ALBUMIN SERPL BCP-MCNC: 4.5 G/DL (ref 3.2–4.9)
ALBUMIN/GLOB SERPL: 1.7 G/DL
ALP SERPL-CCNC: 62 U/L (ref 30–99)
ALT SERPL-CCNC: 18 U/L (ref 2–50)
ANION GAP SERPL CALC-SCNC: 8 MMOL/L (ref 0–11.9)
AST SERPL-CCNC: 17 U/L (ref 12–45)
BASOPHILS # BLD AUTO: 0.4 % (ref 0–1.8)
BASOPHILS # BLD: 0.03 K/UL (ref 0–0.12)
BILIRUB SERPL-MCNC: 1.8 MG/DL (ref 0.1–1.5)
BUN SERPL-MCNC: 18 MG/DL (ref 8–22)
CALCIUM SERPL-MCNC: 9 MG/DL (ref 8.5–10.5)
CHLORIDE SERPL-SCNC: 104 MMOL/L (ref 96–112)
CO2 SERPL-SCNC: 27 MMOL/L (ref 20–33)
CREAT SERPL-MCNC: 0.87 MG/DL (ref 0.5–1.4)
EOSINOPHIL # BLD AUTO: 0.05 K/UL (ref 0–0.51)
EOSINOPHIL NFR BLD: 0.7 % (ref 0–6.9)
ERYTHROCYTE [DISTWIDTH] IN BLOOD BY AUTOMATED COUNT: 39.5 FL (ref 35.9–50)
GLOBULIN SER CALC-MCNC: 2.6 G/DL (ref 1.9–3.5)
GLUCOSE SERPL-MCNC: 182 MG/DL (ref 65–99)
HCT VFR BLD AUTO: 34.5 % (ref 42–52)
HGB BLD-MCNC: 11.3 G/DL (ref 14–18)
IMM GRANULOCYTES # BLD AUTO: 0.02 K/UL (ref 0–0.11)
IMM GRANULOCYTES NFR BLD AUTO: 0.3 % (ref 0–0.9)
IRON SATN MFR SERPL: 24 % (ref 15–55)
IRON SERPL-MCNC: 70 UG/DL (ref 50–180)
LYMPHOCYTES # BLD AUTO: 1.91 K/UL (ref 1–4.8)
LYMPHOCYTES NFR BLD: 24.9 % (ref 22–41)
MCH RBC QN AUTO: 24.4 PG (ref 27–33)
MCHC RBC AUTO-ENTMCNC: 32.8 G/DL (ref 33.7–35.3)
MCV RBC AUTO: 74.5 FL (ref 81.4–97.8)
MONOCYTES # BLD AUTO: 0.84 K/UL (ref 0–0.85)
MONOCYTES NFR BLD AUTO: 11 % (ref 0–13.4)
NEUTROPHILS # BLD AUTO: 4.82 K/UL (ref 1.82–7.42)
NEUTROPHILS NFR BLD: 62.7 % (ref 44–72)
NRBC # BLD AUTO: 0 K/UL
NRBC BLD-RTO: 0 /100 WBC
PLATELET # BLD AUTO: 184 K/UL (ref 164–446)
PMV BLD AUTO: 11.4 FL (ref 9–12.9)
POTASSIUM SERPL-SCNC: 4.2 MMOL/L (ref 3.6–5.5)
PROT SERPL-MCNC: 7.1 G/DL (ref 6–8.2)
RBC # BLD AUTO: 4.63 M/UL (ref 4.7–6.1)
SODIUM SERPL-SCNC: 139 MMOL/L (ref 135–145)
TIBC SERPL-MCNC: 295 UG/DL (ref 250–450)
WBC # BLD AUTO: 7.7 K/UL (ref 4.8–10.8)

## 2018-02-26 PROCEDURE — 82728 ASSAY OF FERRITIN: CPT

## 2018-02-26 PROCEDURE — 83540 ASSAY OF IRON: CPT

## 2018-02-26 PROCEDURE — 83550 IRON BINDING TEST: CPT

## 2018-02-26 PROCEDURE — 99214 OFFICE O/P EST MOD 30 MIN: CPT | Performed by: INTERNAL MEDICINE

## 2018-02-26 PROCEDURE — 36415 COLL VENOUS BLD VENIPUNCTURE: CPT

## 2018-02-26 PROCEDURE — 85025 COMPLETE CBC W/AUTO DIFF WBC: CPT

## 2018-02-26 PROCEDURE — 80053 COMPREHEN METABOLIC PANEL: CPT

## 2018-02-26 PROCEDURE — 83036 HEMOGLOBIN GLYCOSYLATED A1C: CPT
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
## 2018-02-27 ENCOUNTER — TELEPHONE (OUTPATIENT)
Dept: MEDICAL GROUP | Facility: MEDICAL CENTER | Age: 78
End: 2018-02-27

## 2018-02-27 LAB
EST. AVERAGE GLUCOSE BLD GHB EST-MCNC: 157 MG/DL
FERRITIN SERPL-MCNC: 1200.7 NG/ML (ref 22–322)
HBA1C MFR BLD: 7.1 % (ref 0–5.6)

## 2018-02-27 NOTE — TELEPHONE ENCOUNTER
Notified wife Jaelyn with results  A1c stable continue metformin  Iron levels decreased, he still declines phlebotomy or liver biopsy, we will monitor repeat in 3 months  Continue recommend no alcohol  Follow-up as scheduled 3 months

## 2018-04-24 DIAGNOSIS — E78.5 DYSLIPIDEMIA: Chronic | ICD-10-CM

## 2018-04-24 RX ORDER — PRAVASTATIN SODIUM 10 MG
10 TABLET ORAL DAILY
Qty: 90 TAB | Refills: 1 | Status: SHIPPED | OUTPATIENT
Start: 2018-04-24 | End: 2018-05-01 | Stop reason: SDUPTHER

## 2018-04-25 NOTE — TELEPHONE ENCOUNTER
Pravastatin    Was the patient seen in the last year in this department? Yes Last 2/26/18.    Does patient have an active prescription for medications requested? No     Received Request Via: Pharmacy

## 2018-05-01 DIAGNOSIS — E78.5 DYSLIPIDEMIA: Chronic | ICD-10-CM

## 2018-05-01 RX ORDER — PRAVASTATIN SODIUM 10 MG
10 TABLET ORAL DAILY
Qty: 90 TAB | Refills: 1 | Status: SHIPPED | OUTPATIENT
Start: 2018-05-01 | End: 2018-11-13 | Stop reason: SDUPTHER

## 2018-05-21 ENCOUNTER — TELEPHONE (OUTPATIENT)
Dept: MEDICAL GROUP | Facility: PHYSICIAN GROUP | Age: 78
End: 2018-05-21

## 2018-05-21 ENCOUNTER — OFFICE VISIT (OUTPATIENT)
Dept: MEDICAL GROUP | Facility: MEDICAL CENTER | Age: 78
End: 2018-05-21
Payer: MEDICARE

## 2018-05-21 ENCOUNTER — HOSPITAL ENCOUNTER (OUTPATIENT)
Dept: LAB | Facility: MEDICAL CENTER | Age: 78
End: 2018-05-21
Attending: INTERNAL MEDICINE
Payer: MEDICARE

## 2018-05-21 VITALS
RESPIRATION RATE: 16 BRPM | BODY MASS INDEX: 25.16 KG/M2 | HEART RATE: 86 BPM | WEIGHT: 142 LBS | OXYGEN SATURATION: 97 % | HEIGHT: 63 IN | TEMPERATURE: 97.2 F | DIASTOLIC BLOOD PRESSURE: 54 MMHG | SYSTOLIC BLOOD PRESSURE: 136 MMHG

## 2018-05-21 DIAGNOSIS — E83.19 IRON OVERLOAD: Chronic | ICD-10-CM

## 2018-05-21 DIAGNOSIS — D64.9 ANEMIA, UNSPECIFIED TYPE: Chronic | ICD-10-CM

## 2018-05-21 DIAGNOSIS — E11.9 DIABETES MELLITUS TYPE 2, DIET-CONTROLLED (HCC): Chronic | ICD-10-CM

## 2018-05-21 DIAGNOSIS — E78.5 DYSLIPIDEMIA: Chronic | ICD-10-CM

## 2018-05-21 DIAGNOSIS — Z00.00 PREVENTATIVE HEALTH CARE: Chronic | ICD-10-CM

## 2018-05-21 DIAGNOSIS — Z23 NEED FOR HEPATITIS B VACCINATION: ICD-10-CM

## 2018-05-21 LAB
ALBUMIN SERPL BCP-MCNC: 4.6 G/DL (ref 3.2–4.9)
ALBUMIN/GLOB SERPL: 1.8 G/DL
ALP SERPL-CCNC: 51 U/L (ref 30–99)
ALT SERPL-CCNC: 18 U/L (ref 2–50)
ANION GAP SERPL CALC-SCNC: 9 MMOL/L (ref 0–11.9)
AST SERPL-CCNC: 18 U/L (ref 12–45)
BASOPHILS # BLD AUTO: 0.7 % (ref 0–1.8)
BASOPHILS # BLD: 0.04 K/UL (ref 0–0.12)
BILIRUB SERPL-MCNC: 1.3 MG/DL (ref 0.1–1.5)
BUN SERPL-MCNC: 18 MG/DL (ref 8–22)
CALCIUM SERPL-MCNC: 9.1 MG/DL (ref 8.5–10.5)
CHLORIDE SERPL-SCNC: 103 MMOL/L (ref 96–112)
CO2 SERPL-SCNC: 27 MMOL/L (ref 20–33)
CREAT SERPL-MCNC: 0.8 MG/DL (ref 0.5–1.4)
EOSINOPHIL # BLD AUTO: 0.13 K/UL (ref 0–0.51)
EOSINOPHIL NFR BLD: 2.3 % (ref 0–6.9)
ERYTHROCYTE [DISTWIDTH] IN BLOOD BY AUTOMATED COUNT: 40.3 FL (ref 35.9–50)
EST. AVERAGE GLUCOSE BLD GHB EST-MCNC: 171 MG/DL
FERRITIN SERPL-MCNC: 1566 NG/ML (ref 22–322)
GLOBULIN SER CALC-MCNC: 2.6 G/DL (ref 1.9–3.5)
GLUCOSE SERPL-MCNC: 148 MG/DL (ref 65–99)
HBA1C MFR BLD: 7.6 % (ref 0–5.6)
HCT VFR BLD AUTO: 38.3 % (ref 42–52)
HGB BLD-MCNC: 12.3 G/DL (ref 14–18)
IMM GRANULOCYTES # BLD AUTO: 0.01 K/UL (ref 0–0.11)
IMM GRANULOCYTES NFR BLD AUTO: 0.2 % (ref 0–0.9)
IRON SATN MFR SERPL: 46 % (ref 15–55)
IRON SERPL-MCNC: 148 UG/DL (ref 50–180)
LYMPHOCYTES # BLD AUTO: 2.14 K/UL (ref 1–4.8)
LYMPHOCYTES NFR BLD: 37.3 % (ref 22–41)
MCH RBC QN AUTO: 23.9 PG (ref 27–33)
MCHC RBC AUTO-ENTMCNC: 32.1 G/DL (ref 33.7–35.3)
MCV RBC AUTO: 74.5 FL (ref 81.4–97.8)
MONOCYTES # BLD AUTO: 0.48 K/UL (ref 0–0.85)
MONOCYTES NFR BLD AUTO: 8.4 % (ref 0–13.4)
NEUTROPHILS # BLD AUTO: 2.93 K/UL (ref 1.82–7.42)
NEUTROPHILS NFR BLD: 51.1 % (ref 44–72)
NRBC # BLD AUTO: 0 K/UL
NRBC BLD-RTO: 0 /100 WBC
PLATELET # BLD AUTO: 215 K/UL (ref 164–446)
PMV BLD AUTO: 11.6 FL (ref 9–12.9)
POTASSIUM SERPL-SCNC: 4.1 MMOL/L (ref 3.6–5.5)
PROT SERPL-MCNC: 7.2 G/DL (ref 6–8.2)
RBC # BLD AUTO: 5.14 M/UL (ref 4.7–6.1)
SODIUM SERPL-SCNC: 139 MMOL/L (ref 135–145)
TIBC SERPL-MCNC: 325 UG/DL (ref 250–450)
WBC # BLD AUTO: 5.7 K/UL (ref 4.8–10.8)

## 2018-05-21 PROCEDURE — 85025 COMPLETE CBC W/AUTO DIFF WBC: CPT

## 2018-05-21 PROCEDURE — 83540 ASSAY OF IRON: CPT

## 2018-05-21 PROCEDURE — 99213 OFFICE O/P EST LOW 20 MIN: CPT | Mod: 25 | Performed by: INTERNAL MEDICINE

## 2018-05-21 PROCEDURE — 83036 HEMOGLOBIN GLYCOSYLATED A1C: CPT

## 2018-05-21 PROCEDURE — 83550 IRON BINDING TEST: CPT

## 2018-05-21 PROCEDURE — G0010 ADMIN HEPATITIS B VACCINE: HCPCS | Performed by: INTERNAL MEDICINE

## 2018-05-21 PROCEDURE — 82728 ASSAY OF FERRITIN: CPT

## 2018-05-21 PROCEDURE — 36415 COLL VENOUS BLD VENIPUNCTURE: CPT

## 2018-05-21 PROCEDURE — 80053 COMPREHEN METABOLIC PANEL: CPT

## 2018-05-21 PROCEDURE — 90746 HEPB VACCINE 3 DOSE ADULT IM: CPT | Performed by: INTERNAL MEDICINE

## 2018-05-21 NOTE — PROGRESS NOTES
Subjective:      Jane Valentin is a 77 y.o. male who presents with Immunizations (hep b shot, 2nd )            HPI     Here for hepatitis B second in series, first in series received previously, history of diabetes on metformin does not check blood sugars, has cut back on sweets, cut back on alcohol one per day, iron overload disease, has declined biopsy or phlebotomy.  Still works 6 days a week, at [employer].  Still active, no difficulty with memory.  Patient is here with his wife who speaks Chinese and English and helps to translate.  No falls.  No chest pain, short of breath, cough, lightheadedness or syncope  Remains on pravastatin daily  Eyes checked annually, no vision changes  No foot sores or lesions, no history of neuropathy, nephropathy, retinopathy  No tobacco, one alcohol daily      Current Outpatient Prescriptions   Medication Sig Dispense Refill   • pravastatin (PRAVACHOL) 10 MG Tab Take 1 Tab by mouth every day. 90 Tab 1   • metformin (GLUCOPHAGE) 500 MG Tab Take 1 Tab by mouth every day. 90 Tab 2   • aspirin (ASA) 81 MG Chew Tab chewable tablet Take 1 Tab by mouth every day. 100 Tab 11     No current facility-administered medications for this visit.         Patient Active Problem List   Diagnosis   • Dyslipidemia   • Anemia   • Preventative health care   • Iron overload   • Diabetes mellitus type 2, diet-controlled (HCC)   • BPH (benign prostatic hypertrophy)   • Adenomatous colon polyp   • Arthritis of knee   • History of tobacco abuse          Preventative health  7/23/12 tdap  10/29/12 zoster vaccine  4/1/13 pneumovax vaccine  7/28/14  surgery procedure note robotic low anterior resection for unresectable lesion by colonoscopy  6/25/15 colon per [facility] anastomotic stricture 15 cm, estimated diameter 30 mm repeat 3 years  10/28/15 prevnar at [facility]  10/10/16 hep b first in series  10/14/16 FIT negative  4/17/17 psa 1.6     Iron overload  6/09 iron 197,saturation  71%,ferritin 1556,normal hemoglobin hematocrit, normal AST, ALT. Patient is adopted no family history of hemochromatosis. We'll repeat labs plus hemochromatosis genetic testing one month, at this point I am considering recommending phlebotomy once every 2-3 months however his hematocrit is already at 39 which I think is reasonable given the fact that he has no other manifestations of disease such as hepatitis, diabetes, cardiomyopathy.  8/09 iron 202,%sat 86,ferritin 1805, AST 21,ALT 21  9/09 HH negative for C282Y,H63D,S65C  9/09 called patient recommend consider liver biopsy for definitive diagnosis hemochromatosis given negative genetic testing, we will discuss with him, other option would be to proceed with phlebotomy to get hemoglobin less than 12, although without a definitive diagnosis for iron stores liver or genetic testing for hemochromatosis this would not be an ideal treatment situation.  11/16/09 set up for phlebotomy had done 1x, refuses more, refuses liver bx  7/10 iron 231,ferritin 2198,%sat 95,TIBC 242; AST 25,ALT 30; hgb 12,hct 37  11/10 iron 178,ferritin 1997,%sat 74,AST 22,ALT 22,hgb 12.4,hct 37.5,  6/11 iron 180,ferritin 1479,%sat 65,AST 23,ALT 24,hgb 12.4,hct 39,  9/11 iron 113,ferritin 1423,%sat 42,AST 21,ALT 25,hgb 12.6,hct 37.6,; pt cont to decline liver bx, phlebotomy  4/12 iron 192,ferritin 1355,%sat 68,AST 25,ALT 30,hgb 12,hct 37.5; decline phlebotomy  7/12 iron 121,%sat 46,AST 22,ALT 21  10/12 iron 146,ferritin 1367,%sat 52,AST 25,ALT 26; declines phlebotomy  4/1/13 AST 20,ALT 23, ferritin 1312, %sat 45 iron 122, hgb 13,hct 39, mcv 75  1/27/14 AST 21,ALT 21, ferritin 1267, %sat 55, iron 157, hgb 11.8, hct 36, mcv 73  6/2/14 AST 20,ALT 20, ferritin 1275, %sat   Iron hgb 11.2, hct 33.6,mcv 73  11/18/14 AST 17,ALT 17,hgb 11.5,hct 36,mcv 75,iron 72,%sat 24,ferritin 1229  4/28/15 AST 21,ALT 20,hgb 11.2,hct 35,mcv 76,iron 178,%sat 67,ferritin 1316  10/28/15 iron 180,%sat  64,ferritin cancelled,AST 20,ALT 21,hemochromatosis C282Y negative, H63D negative, S56C negative  1/11/16 iron  6/6/16 hgb 11.3,hct 33.8,mcv 74,iron 132,%sat 45,ferritin 139,AST 21,ALT 18 21,%sat 8,ferritin 1414,hgb 11.4,hct 35,mcv 74,AST 19,ALT 19  9/10/16 AST 22,ALT 20,hgb 10.5,hct 31.6,mcv 75  10/11/16 AST 21,ALT 20,alk phos 66,bili 1.8  4/17/17 AST 18,ALT 17,iron 222,ferritin 1441,,hgb 11.3,hct 35,mcv 74  10/23/17 AST 20,ALT 17,iron 187,%sat 63, ferritin 1361, hgb 11.6,hct 36, A1c 7.2%  2/27/18 AST 17,ALT 18,iron 70,%sat 24,ferritin 1200,hgb 11.3,hct 34.5     Dyslipidemia  4/08 chol 183,trig 114,hdl 66,ldl 94  7/10 chol 167,trig 99,hdl 50,ldl 97  10/12 chol 183,trig 67,hdl 51,ldl 119  1/27/14 chol 170,trig 53,hdl 55,ldl 104  4/28/15 chol 159,trig 77,hdl 52,ldl 92  10/28/15 chol 185,trig 68,hdl 59,ldl 112  1/11/16 chol 172,trig 62,hdl 61,ldl 99 start pravachol 10 mg (diabetes)  6/6/16 chol 158,trig 63,hdl 58,ldl 87 on pravachol 10 mg  4/17/17 change to pravachol 20 mg 1/2 per day  4/17/17 chol 134,trig 61,hdl 60,ldl 62 on pravachol 20 mg take 1/2 per day  10/23/17 chol 130,trig 57,hdl 59,ldl 60 on pravachol 20 mg take 1/2 per day     Diabetes  8/09 blood sugar 126 likely related to hemochromatosis  7/10 bs 117,A1c 6.6%  11/10 bs 111  9/11 bs 125  4/12 A1c 6.5%,bs 142  7/12 A1c 6.3%,bs 122  10/12 A1c 6.9%,bs 132 pt declines fingerstick bs testing  4/1/13 A1c 6.6%; bs 137  pt declines fingerstick bs testing  1/27/14 A1c 6.7%, urine mac 30; declines to check his own blood sugar, declines diabetes education  6/2/14 A1c 6.3% no meds, declines to check blood sugar  11/18/14 A1c 6.9% no meds, declines to check blood sugars  11/19/14 start metformin 500 mg qday, patient declines checking blood sugars, repeat labs three months  11/19/14  eye exam cataract  2/26/15 not taking metformin, recommend take 500 mg daily  4/28/15 A1c 7.1%, bs 171,urine mac 18, not taking metformin  6/1/15 declines diabetes classes,  ACE, statin, asa    6/29/15 resume metformin 500 mg bid  10/19/15 diabetes education classes referral  10/28/15 A1c 6.8%, bs 141 on metformin 500 mg once per day, not checking sugars, diabetes classes pending  1/11/16 A1c 6.9% on metformin 500 mg qday, declines ACE, start pravachol 10 mg  6/6/16 A1c 6.6% on metformin 500 mg qday  10/11/16 A1c 7% on metformin 500 mg qday declines ACE, I recommend asa 81 mg  12/5/16  eye exam bilateral cataract, vitreous degeneration  4/17/17 A1c 7.2% on metformin 500 mg qday declines diabetic classes, education, check blood sugars, and ACE inhibitor; remains on metformin 500 mg daily  10/23/17 A1c 7.2% on metformin 500 mg qday, not checking blood sugar regularly, has iron overload hemochromatosis likely, but declines liver biopsy or phlebotomy  2/27/18 A1c 7.1% on metformin 500 mg  4/2/18  eye exam, no evidence of diabetic retinopathy     BPH  6/20/11 trial of flomax  9/11 psa 1.2  10/12 flomax resume and start proscar  10/12 psa 1.4  4/1/13 off flomax and proscar     Arthritis left knee     Anemia  11/08 hgb 12.2,hct 35.6, mcv 73  6/09  Hgb 12.7, hct 39.2, mcv 77, normal B12 and folate, iron overload see note  7/10 hgb 12,hct 37  11/10 hgb 12.4,hct 37.5  9/11 hgb 12.6,hct 37.6,mcv 75  4/12 hgb 12,hct 37.5  7/12 hgb 11.9,hct 37,mcv 75  10/29/12 hgb 12.3,hct 36.2,mcv 73;iron 146,ferritin 1367,%sat 52,ESR 18, B12 383,folate 13; SPEP gamma globulin 1.6, no M-spike noted; FIT ordered  4/1/13 hgb 13,hct 39,mcv 75; iron 122,ferritin 1312,%sat 45  1/27/14 hgb 11.8,hct 36,mcv 73,iron 157,ferritin 1267,%sat 55; pt declines referral evaluate liver biopsy  6/2/14 hgb 11.2,hct 33.6,mcv 73,iron 165,%sat 63,ferritin 1275, b12 318, folate 11.7,adj retic 1.1%; SPEP negative  11/18/14 hgb 11.5,hct 36,mcv 75,iron 72,%sat 24,ferritin 1229  4/28/15 hgb 11.2,hct 35,mcv 96,iron 178,%sat 67,ferritin 1316  10/28/15 hgb 11.7,hct 36,mcv 74,iron 180,%sat 64  1/11/16 hgb 11.4,hct 35,mcv  74,iron 21,%sat 8,ferritin 1414,SPEP negative  6/25/15 colon per [facility] anastomotic stricture 15 cm, estimated diameter 30 mm repeat 3 years  6/6/16 hgb 11.3,hct 33.8,mcv 74,iron 132,%sat 45,ferritin 1392,b12 337,folate 12,,retic 1.8,SPEP negative  9/10/16 hgb 10.5,hct 31.6,mcv 75  10/11/16 hgb 10.9,hct 33,mcv 75,iron 135,%sat 43,ferritin 1457  10/14/16 FIT negative  4/17/17 hgb 11.3,hct 35,mcv 74.5,iron 222,ferrritin 1441,,b12 362,folate 11,SPEP negative  10/23/17 hgb 11.6,hct 36,mcv 74.9,iron 187,%sat 63, ferritin 1361 likely hemachromatosis declines liver biopsy or phlebotomy     Adenoma  9/26/07 colon per [facility] negative mixed adenoma polyp  7/2/14 colon per [facility] tubular adenoma x 3 and tubulovillous adenoma, repeat colonoscopy 6 months  7/28/14  surgery procedure note robotic low anterior resection for unresectable lesion by colonoscopy  6/25/15 colon per [facility] anastomotic stricture 15 cm, estimated diameter 30 mm repeat 3 years                        Health Maintenance Summary                URINE ACR / MICROALBUMIN Overdue 4/17/2018      Done 4/17/2017 MICROALBUMIN CREAT RATIO URINE      Patient has more history with this topic...    DIABETES MONOFILAMENT / LE EXAM Overdue 4/17/2018      Done 4/17/2017 AMB DIABETIC MONOFILAMENT LOWER EXTREMITY EXAM     Patient has more history with this topic...    COLONOSCOPY Next Due 6/25/2018      Done 6/25/2015 AMB REFERRAL TO GI FOR COLONOSCOPY     Patient has more history with this topic...    A1C SCREENING Next Due 8/26/2018      Done 2/26/2018 HEMOGLOBIN A1C      Patient has more history with this topic...    IMM INFLUENZA Next Due 9/1/2018      Done 10/10/2016 Imm Admin: Influenza Vaccine Adult HD     Patient has more history with this topic...    FASTING LIPID PROFILE Next Due 10/23/2018      Done 10/23/2017 LIPID PROFILE     Patient has more history with this topic...    Annual Wellness Visit Next Due 10/24/2018      Done 10/23/2017 Visit Dx: Medicare  "annual wellness visit, subsequent     Patient has more history with this topic...    SERUM CREATININE Next Due 2/26/2019      Done 2/26/2018 COMP METABOLIC PANEL      Patient has more history with this topic...    RETINAL SCREENING Next Due 4/2/2019      Done 4/2/2018 REFERRAL FOR RETINAL SCREENING EXAM     Patient has more history with this topic...    IMM DTaP/Tdap/Td Vaccine Next Due 7/23/2022      Done 7/23/2012 Imm Admin: Tdap Vaccine          Patient Care Team:  Thomas Greenberg M.D. as PCP - General  Mariana Adhikari O.D. as Consulting Physician (Optometry)  River FIGUEROA M.D. (Inactive) as Consulting Physician (Gastroenterology)  Massimo Zamudio M.D. as Consulting Physician (Surgery)    ROS       Objective:     /54   Pulse 86   Temp 36.2 °C (97.2 °F)   Resp 16   Ht 1.6 m (5' 3\")   Wt 64.4 kg (142 lb)   SpO2 97%   BMI 25.15 kg/m²      Physical Exam   Constitutional: He appears well-developed and well-nourished. No distress.   HENT:   Head: Normocephalic and atraumatic.   Eyes: Conjunctivae are normal. Right eye exhibits no discharge. Left eye exhibits no discharge.   Neck: Neck supple. No JVD present. No thyromegaly present.   Cardiovascular: Normal rate, regular rhythm and normal heart sounds.    Pulmonary/Chest: Effort normal and breath sounds normal. No respiratory distress. He has no wheezes.   Abdominal: He exhibits no distension. There is no tenderness.   Neurological: He is alert.   Skin: Skin is warm. He is not diaphoretic.   Psychiatric: He has a normal mood and affect. His behavior is normal.   Nursing note and vitals reviewed.              Assessment/Plan:     Assessment  #!  Hepatitis B vaccine status, need second in series    #2 type 2 diabetes on metformin, no retinopathy, neuropathy, nephropathy history, declines to check blood sugars    #3 dyslipidemia on pravastatin no muscle pain    #4 iron overload, has declined biopsy, most recent iron 70, saturation 24, ferritin 1200, " hemoglobin 11, hematocrit 34, AST 70, ALT 18, diabetes, no history of CHF or arthritis      Plan  #1 hepatitis B vaccine second in series     #2 follow-up 5 months complete hepatitis B series     #3 declines shingrix     #4 repeat labs    #5 check feet daily, annual eye exam    #6 Minimize alcohol, continue no tobacco    #7 declines to check blood sugars with glucometer understanding diabetes increased risk for cardiac disease, blindness, kidney disease, amputations

## 2018-05-22 ENCOUNTER — TELEPHONE (OUTPATIENT)
Dept: MEDICAL GROUP | Facility: MEDICAL CENTER | Age: 78
End: 2018-05-22

## 2018-05-22 NOTE — TELEPHONE ENCOUNTER
----- Message from Thomas Greenberg M.D. sent at 5/21/2018  9:49 PM PDT -----  Please notify patient's wife that the blood test shows  (1) normal liver function and kidney function  (2) iron levels are stable, blood counts are stable  (3) we can repeat his blood test in 3 months

## 2018-05-22 NOTE — TELEPHONE ENCOUNTER
Please notify patient's wife that the blood test shows  (1) normal liver function and kidney function  (2) iron levels are stable, blood counts are stable  (3) blood sugar 3 month average test is elevated at 7.6%, goal is 7.0% or less, I would recommend considering increasing the metformin diabetes pill to twice a day.  Please let me know their decision  (4) we can repeat his blood test in 3 months

## 2018-05-24 NOTE — TELEPHONE ENCOUNTER
Pt notified   They agree with change please send new prescription to [facility]

## 2018-08-23 ENCOUNTER — PATIENT OUTREACH (OUTPATIENT)
Dept: HEALTH INFORMATION MANAGEMENT | Facility: OTHER | Age: 78
End: 2018-08-23

## 2018-08-23 NOTE — PROGRESS NOTES
Outcome: Left Message    Please transfer to Patient Outreach Team at [phone] when patient returns call.    Web[employer] Checked & Epic Updated:  yes    [facility] Verified: yes    Attempt # 1

## 2018-09-17 ENCOUNTER — OFFICE VISIT (OUTPATIENT)
Dept: MEDICAL GROUP | Facility: MEDICAL CENTER | Age: 78
End: 2018-09-17
Payer: MEDICARE

## 2018-09-17 ENCOUNTER — HOSPITAL ENCOUNTER (OUTPATIENT)
Dept: LAB | Facility: MEDICAL CENTER | Age: 78
End: 2018-09-17
Attending: INTERNAL MEDICINE
Payer: MEDICARE

## 2018-09-17 ENCOUNTER — TELEPHONE (OUTPATIENT)
Dept: MEDICAL GROUP | Facility: MEDICAL CENTER | Age: 78
End: 2018-09-17

## 2018-09-17 VITALS
TEMPERATURE: 97.2 F | HEART RATE: 76 BPM | RESPIRATION RATE: 16 BRPM | SYSTOLIC BLOOD PRESSURE: 126 MMHG | OXYGEN SATURATION: 97 % | WEIGHT: 144 LBS | DIASTOLIC BLOOD PRESSURE: 60 MMHG | BODY MASS INDEX: 25.52 KG/M2 | HEIGHT: 63 IN

## 2018-09-17 DIAGNOSIS — E11.9 DIABETES MELLITUS TYPE 2, DIET-CONTROLLED (HCC): Chronic | ICD-10-CM

## 2018-09-17 DIAGNOSIS — E78.5 DYSLIPIDEMIA: Chronic | ICD-10-CM

## 2018-09-17 DIAGNOSIS — D64.9 ANEMIA, UNSPECIFIED TYPE: Chronic | ICD-10-CM

## 2018-09-17 DIAGNOSIS — Z12.11 COLON CANCER SCREENING: ICD-10-CM

## 2018-09-17 DIAGNOSIS — E83.19 IRON OVERLOAD: Chronic | ICD-10-CM

## 2018-09-17 DIAGNOSIS — Z23 NEEDS FLU SHOT: ICD-10-CM

## 2018-09-17 LAB
ALBUMIN SERPL BCP-MCNC: 4.3 G/DL (ref 3.2–4.9)
ALBUMIN/GLOB SERPL: 1.6 G/DL
ALP SERPL-CCNC: 59 U/L (ref 30–99)
ALT SERPL-CCNC: 16 U/L (ref 2–50)
ANION GAP SERPL CALC-SCNC: 5 MMOL/L (ref 0–11.9)
AST SERPL-CCNC: 20 U/L (ref 12–45)
BASOPHILS # BLD AUTO: 0.8 % (ref 0–1.8)
BASOPHILS # BLD: 0.04 K/UL (ref 0–0.12)
BILIRUB SERPL-MCNC: 1.8 MG/DL (ref 0.1–1.5)
BUN SERPL-MCNC: 20 MG/DL (ref 8–22)
CALCIUM SERPL-MCNC: 9 MG/DL (ref 8.5–10.5)
CHLORIDE SERPL-SCNC: 104 MMOL/L (ref 96–112)
CHOLEST SERPL-MCNC: 133 MG/DL (ref 100–199)
CO2 SERPL-SCNC: 27 MMOL/L (ref 20–33)
CREAT SERPL-MCNC: 0.87 MG/DL (ref 0.5–1.4)
EOSINOPHIL # BLD AUTO: 0.09 K/UL (ref 0–0.51)
EOSINOPHIL NFR BLD: 1.7 % (ref 0–6.9)
ERYTHROCYTE [DISTWIDTH] IN BLOOD BY AUTOMATED COUNT: 41.2 FL (ref 35.9–50)
FASTING STATUS PATIENT QL REPORTED: NORMAL
FERRITIN SERPL-MCNC: 1451.8 NG/ML (ref 22–322)
GLOBULIN SER CALC-MCNC: 2.7 G/DL (ref 1.9–3.5)
GLUCOSE SERPL-MCNC: 163 MG/DL (ref 65–99)
HCT VFR BLD AUTO: 34.6 % (ref 42–52)
HDLC SERPL-MCNC: 56 MG/DL
HGB BLD-MCNC: 11 G/DL (ref 14–18)
IMM GRANULOCYTES # BLD AUTO: 0.02 K/UL (ref 0–0.11)
IMM GRANULOCYTES NFR BLD AUTO: 0.4 % (ref 0–0.9)
IRON SATN MFR SERPL: 55 % (ref 15–55)
IRON SERPL-MCNC: 169 UG/DL (ref 50–180)
LDLC SERPL CALC-MCNC: 64 MG/DL
LYMPHOCYTES # BLD AUTO: 1.74 K/UL (ref 1–4.8)
LYMPHOCYTES NFR BLD: 33.3 % (ref 22–41)
MCH RBC QN AUTO: 24.1 PG (ref 27–33)
MCHC RBC AUTO-ENTMCNC: 31.8 G/DL (ref 33.7–35.3)
MCV RBC AUTO: 75.9 FL (ref 81.4–97.8)
MONOCYTES # BLD AUTO: 0.34 K/UL (ref 0–0.85)
MONOCYTES NFR BLD AUTO: 6.5 % (ref 0–13.4)
NEUTROPHILS # BLD AUTO: 2.99 K/UL (ref 1.82–7.42)
NEUTROPHILS NFR BLD: 57.3 % (ref 44–72)
NRBC # BLD AUTO: 0 K/UL
NRBC BLD-RTO: 0 /100 WBC
PLATELET # BLD AUTO: 190 K/UL (ref 164–446)
PMV BLD AUTO: 10.3 FL (ref 9–12.9)
POTASSIUM SERPL-SCNC: 3.8 MMOL/L (ref 3.6–5.5)
PROT SERPL-MCNC: 7 G/DL (ref 6–8.2)
RBC # BLD AUTO: 4.56 M/UL (ref 4.7–6.1)
SODIUM SERPL-SCNC: 136 MMOL/L (ref 135–145)
TIBC SERPL-MCNC: 307 UG/DL (ref 250–450)
TRIGL SERPL-MCNC: 65 MG/DL (ref 0–149)
TSH SERPL DL<=0.005 MIU/L-ACNC: 0.98 UIU/ML (ref 0.38–5.33)
WBC # BLD AUTO: 5.2 K/UL (ref 4.8–10.8)

## 2018-09-17 PROCEDURE — 99213 OFFICE O/P EST LOW 20 MIN: CPT | Mod: 25 | Performed by: INTERNAL MEDICINE

## 2018-09-17 PROCEDURE — 84443 ASSAY THYROID STIM HORMONE: CPT

## 2018-09-17 PROCEDURE — 36415 COLL VENOUS BLD VENIPUNCTURE: CPT

## 2018-09-17 PROCEDURE — 85025 COMPLETE CBC W/AUTO DIFF WBC: CPT

## 2018-09-17 PROCEDURE — 83036 HEMOGLOBIN GLYCOSYLATED A1C: CPT

## 2018-09-17 PROCEDURE — 80053 COMPREHEN METABOLIC PANEL: CPT

## 2018-09-17 PROCEDURE — 80061 LIPID PANEL: CPT

## 2018-09-17 PROCEDURE — 90662 IIV NO PRSV INCREASED AG IM: CPT | Performed by: INTERNAL MEDICINE

## 2018-09-17 PROCEDURE — 82728 ASSAY OF FERRITIN: CPT

## 2018-09-17 PROCEDURE — 83540 ASSAY OF IRON: CPT

## 2018-09-17 PROCEDURE — G0008 ADMIN INFLUENZA VIRUS VAC: HCPCS | Performed by: INTERNAL MEDICINE

## 2018-09-17 PROCEDURE — 83550 IRON BINDING TEST: CPT

## 2018-09-17 NOTE — PROGRESS NOTES
Subjective:      Jane Valentin is a 77 y.o. 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  Patient has declined to check his blood sugars, has declined diabetic education.  Patient here with his wife who translates.  Patient has cut out soda, no fruit juices, 2 drinks of alcohol daily, tries to limit carbohydrate portion size.  Patient keeps active, works 6 days a week, .  Denies chest pain, shortness of breath, cough, lightheadedness, syncope.  No history of retinopathy, neuropathy, nephropathy, most recent ophthalmologic exam in April  On statin, no muscle pain or muscle aches  Chronic anemia, no weakness, no melena, no hematochezia, due for upcoming colonoscopy for adenoma, denies blood in stools, no regular NSAIDs  Iron overload, has declined phlebotomy, has declined liver biopsy for possible hemochromatosis  No tobacco, 2 drinks of alcohol per day      Current Outpatient Prescriptions   Medication Sig Dispense Refill   • metFORMIN (GLUCOPHAGE) 500 MG Tab Take 1 Tab by mouth 2 times a day, with meals. 180 Tab 1   • pravastatin (PRAVACHOL) 10 MG Tab Take 1 Tab by mouth every day. 90 Tab 1   • metformin (GLUCOPHAGE) 500 MG Tab Take 1 Tab by mouth every day. 90 Tab 2   • aspirin (ASA) 81 MG Chew Tab chewable tablet Take 1 Tab by mouth every day. 100 Tab 11     No current facility-administered medications for this visit.           Preventative health  7/23/12 tdap  10/29/12 zoster vaccine  4/1/13 pneumovax vaccine  7/28/14  surgery procedure note robotic low anterior resection for unresectable lesion by colonoscopy  6/25/15 colon per [facility] anastomotic stricture 15 cm, estimated diameter 30 mm repeat 3 years  10/28/15 prevnar at [facility]  10/10/16 hep b first in series  10/14/16 FIT negative  4/17/17 psa 1.6  5/21/8 hep b second in series   5/21/18  declines shingrix      Iron overload  6/09 iron 197,saturation 71%,ferritin 1556,normal hemoglobin hematocrit, normal AST, ALT. Patient is adopted no family history of hemochromatosis. We'll repeat labs plus hemochromatosis genetic testing one month, at this point I am considering recommending phlebotomy once every 2-3 months however his hematocrit is already at 39 which I think is reasonable given the fact that he has no other manifestations of disease such as hepatitis, diabetes, cardiomyopathy.  8/09 iron 202,%sat 86,ferritin 1805, AST 21,ALT 21  9/09 HH negative for C282Y,H63D,S65C  9/09 called patient recommend consider liver biopsy for definitive diagnosis hemochromatosis given negative genetic testing, we will discuss with him, other option would be to proceed with phlebotomy to get hemoglobin less than 12, although without a definitive diagnosis for iron stores liver or genetic testing for hemochromatosis this would not be an ideal treatment situation.  11/16/09 set up for phlebotomy had done 1x, refuses more, refuses liver bx  7/10 iron 231,ferritin 2198,%sat 95,TIBC 242; AST 25,ALT 30; hgb 12,hct 37  11/10 iron 178,ferritin 1997,%sat 74,AST 22,ALT 22,hgb 12.4,hct 37.5,  6/11 iron 180,ferritin 1479,%sat 65,AST 23,ALT 24,hgb 12.4,hct 39,  9/11 iron 113,ferritin 1423,%sat 42,AST 21,ALT 25,hgb 12.6,hct 37.6,; pt cont to decline liver bx, phlebotomy  4/12 iron 192,ferritin 1355,%sat 68,AST 25,ALT 30,hgb 12,hct 37.5; decline phlebotomy  7/12 iron 121,%sat 46,AST 22,ALT 21  10/12 iron 146,ferritin 1367,%sat 52,AST 25,ALT 26; declines phlebotomy  4/1/13 AST 20,ALT 23, ferritin 1312, %sat 45 iron 122, hgb 13,hct 39, mcv 75  1/27/14 AST 21,ALT 21, ferritin 1267, %sat 55, iron 157, hgb 11.8, hct 36, mcv 73  6/2/14 AST 20,ALT 20, ferritin 1275, %sat   Iron hgb 11.2, hct 33.6,mcv 73  11/18/14 AST 17,ALT 17,hgb 11.5,hct 36,mcv 75,iron 72,%sat 24,ferritin 1229  4/28/15 AST 21,ALT 20,hgb 11.2,hct 35,mcv  76,iron 178,%sat 67,ferritin 1316  10/28/15 iron 180,%sat 64,ferritin cancelled,AST 20,ALT 21,hemochromatosis C282Y negative, H63D negative, S56C negative  1/11/16 iron  6/6/16 hgb 11.3,hct 33.8,mcv 74,iron 132,%sat 45,ferritin 139,AST 21,ALT 18 21,%sat 8,ferritin 1414,hgb 11.4,hct 35,mcv 74,AST 19,ALT 19  9/10/16 AST 22,ALT 20,hgb 10.5,hct 31.6,mcv 75  10/11/16 AST 21,ALT 20,alk phos 66,bili 1.8  4/17/17 AST 18,ALT 17,iron 222,ferritin 1441,,hgb 11.3,hct 35,mcv 74  10/23/17 AST 20,ALT 17,iron 187,%sat 63, ferritin 1361, hgb 11.6,hct 36, A1c 7.2%  2/27/18 AST 17,ALT 18,iron 70,%sat 24,ferritin 1200,hgb 11.3,hct 34.5  5/21/18 AST 18,ALT 18,iron 148,%sat 46,ferritin 1566,hgb 12.3,hct 38     Dyslipidemia  4/08 chol 183,trig 114,hdl 66,ldl 94  7/10 chol 167,trig 99,hdl 50,ldl 97  10/12 chol 183,trig 67,hdl 51,ldl 119  1/27/14 chol 170,trig 53,hdl 55,ldl 104  4/28/15 chol 159,trig 77,hdl 52,ldl 92  10/28/15 chol 185,trig 68,hdl 59,ldl 112  1/11/16 chol 172,trig 62,hdl 61,ldl 99 start pravachol 10 mg (diabetes)  6/6/16 chol 158,trig 63,hdl 58,ldl 87 on pravachol 10 mg  4/17/17 change to pravachol 20 mg 1/2 per day  4/17/17 chol 134,trig 61,hdl 60,ldl 62 on pravachol 20 mg take 1/2 per day  10/23/17 chol 130,trig 57,hdl 59,ldl 60 on pravachol 20 mg take 1/2 per day  5/21/18 on pravachol 10 mg daily     Diabetes  8/09 blood sugar 126 likely related to hemochromatosis  7/10 bs 117,A1c 6.6%  11/10 bs 111  9/11 bs 125  4/12 A1c 6.5%,bs 142  7/12 A1c 6.3%,bs 122  10/12 A1c 6.9%,bs 132 pt declines fingerstick bs testing  4/1/13 A1c 6.6%; bs 137  pt declines fingerstick bs testing  1/27/14 A1c 6.7%, urine mac 30; declines to check his own blood sugar, declines diabetes education  6/2/14 A1c 6.3% no meds, declines to check blood sugar  11/18/14 A1c 6.9% no meds, declines to check blood sugars  11/19/14 start metformin 500 mg qday, patient declines checking blood sugars, repeat labs three months  11/19/14  eye exam  cataract  2/26/15 not taking metformin, recommend take 500 mg daily  4/28/15 A1c 7.1%, bs 171,urine mac 18, not taking metformin  6/1/15 declines diabetes classes, ACE, statin, asa    6/29/15 resume metformin 500 mg bid  10/19/15 diabetes education classes referral  10/28/15 A1c 6.8%, bs 141 on metformin 500 mg once per day, not checking sugars, diabetes classes pending  1/11/16 A1c 6.9% on metformin 500 mg qday, declines ACE, start pravachol 10 mg  6/6/16 A1c 6.6% on metformin 500 mg qday  10/11/16 A1c 7% on metformin 500 mg qday declines ACE, I recommend asa 81 mg  12/5/16  eye exam bilateral cataract, vitreous degeneration  4/17/17 A1c 7.2% on metformin 500 mg qday declines diabetic classes, education, check blood sugars, and ACE inhibitor; remains on metformin 500 mg daily  10/23/17 A1c 7.2% on metformin 500 mg qday, not checking blood sugar regularly, has iron overload hemochromatosis likely, but declines liver biopsy or phlebotomy  2/27/18 A1c 7.1% on metformin 500 mg  4/2/18  eye exam, no evidence of diabetic retinopathy  5/22/18 A1c 7.6% on metformin 500 mg qday increase to bid      BPH  6/20/11 trial of flomax  9/11 psa 1.2  10/12 flomax resume and start proscar  10/12 psa 1.4  4/1/13 off flomax and proscar     Arthritis left knee     Anemia  11/08 hgb 12.2,hct 35.6, mcv 73  6/09  Hgb 12.7, hct 39.2, mcv 77, normal B12 and folate, iron overload see note  7/10 hgb 12,hct 37  11/10 hgb 12.4,hct 37.5  9/11 hgb 12.6,hct 37.6,mcv 75  4/12 hgb 12,hct 37.5  7/12 hgb 11.9,hct 37,mcv 75  10/29/12 hgb 12.3,hct 36.2,mcv 73;iron 146,ferritin 1367,%sat 52,ESR 18, B12 383,folate 13; SPEP gamma globulin 1.6, no M-spike noted; FIT ordered  4/1/13 hgb 13,hct 39,mcv 75; iron 122,ferritin 1312,%sat 45  1/27/14 hgb 11.8,hct 36,mcv 73,iron 157,ferritin 1267,%sat 55; pt declines referral evaluate liver biopsy  6/2/14 hgb 11.2,hct 33.6,mcv 73,iron 165,%sat 63,ferritin 1275, b12 318, folate 11.7,adj retic 1.1%;  SPEP negative  11/18/14 hgb 11.5,hct 36,mcv 75,iron 72,%sat 24,ferritin 1229  4/28/15 hgb 11.2,hct 35,mcv 96,iron 178,%sat 67,ferritin 1316  6/25/15 colon per [facility] anastomotic stricture 15 cm, estimated diameter 30 mm repeat 3 years  10/28/15 hgb 11.7,hct 36,mcv 74,iron 180,%sat 64  1/11/16 hgb 11.4,hct 35,mcv 74,iron 21,%sat 8,ferritin 1414,SPEP negative  6/6/16 hgb 11.3,hct 33.8,mcv 74,iron 132,%sat 45,ferritin 1392,b12 337,folate 12,,retic 1.8,SPEP negative  9/10/16 hgb 10.5,hct 31.6,mcv 75  10/11/16 hgb 10.9,hct 33,mcv 75,iron 135,%sat 43,ferritin 1457  10/14/16 FIT negative  4/17/17 hgb 11.3,hct 35,mcv 74.5,iron 222,ferrritin 1441,,b12 362,folate 11,SPEP negative  10/23/17 hgb 11.6,hct 36,mcv 74.9,iron 187,%sat 63, ferritin 1361 likely hemachromatosis declines liver biopsy or phlebotomy  5/21/18 hgb 12.3,hct 38,mcv 74.5,iron 148,%sat 46,ferritin 1566     Adenoma  9/26/07 colon per [facility] negative mixed adenoma polyp  7/2/14 colon per [facility] tubular adenoma x 3 and tubulovillous adenoma, repeat colonoscopy 6 months  7/28/14  surgery procedure note robotic low anterior resection for unresectable lesion by colonoscopy  6/25/15 colon per [facility] anastomotic stricture 15 cm, estimated diameter 30 mm repeat 3 years                 Patient Active Problem List   Diagnosis   • Dyslipidemia   • Anemia   • Preventative health care   • Iron overload   • Diabetes mellitus type 2, diet-controlled (HCC)   • BPH (benign prostatic hypertrophy)   • Adenomatous colon polyp   • Arthritis of knee   • History of tobacco abuse         ROS       Objective:          Physical Exam   Constitutional: He appears well-developed and well-nourished. No distress.   HENT:   Head: Normocephalic and atraumatic.   Eyes: Conjunctivae are normal. Right eye exhibits no discharge. Left eye exhibits no discharge.   Neck: Neck supple. No JVD present.   Cardiovascular: Normal rate, regular rhythm and normal heart sounds.    Pulmonary/Chest:  Effort normal and breath sounds normal. No respiratory distress. He has no wheezes.   Abdominal: He exhibits no distension.   Musculoskeletal: He exhibits no edema.   Neurological: He is alert.   Skin: He is not diaphoretic.   Psychiatric: He has a normal mood and affect. His behavior is normal.   Nursing note and vitals reviewed.              Assessment/Plan:     Assessment  #! Diabetes mellitus, on metformin 500 mg once a day, declines to check blood sugars, most recent A1c 7.6%, most recent eye exam in April, no neuropathy, retinopathy, nephropathy    #2 dyslipidemia stable on pravastatin    #3 iron overload, has declined liver biopsy, has declined phlebotomy    #4 anemia stable      Plan  #1 recheck labs    #2 influenza vaccine    #3 declines shingles vaccine    #4 recommend check blood sugar once a day and record, patient declines understanding long-term risk of diabetes heart disease, blindness, kidney failure, declines to check blood sugars, declines diabetic education, continue metformin dose changes based upon labs, limit sweets, candies, no alcohol recommended, regular exercise and activity, check eyes once a year, check feet daily    #5 repeat hepatitis vaccine December    #6 follow-up 3 months

## 2018-09-18 ENCOUNTER — TELEPHONE (OUTPATIENT)
Dept: MEDICAL GROUP | Facility: MEDICAL CENTER | Age: 78
End: 2018-09-18

## 2018-09-18 DIAGNOSIS — E11.9 DIABETES MELLITUS TYPE 2, DIET-CONTROLLED (HCC): Chronic | ICD-10-CM

## 2018-09-18 LAB
EST. AVERAGE GLUCOSE BLD GHB EST-MCNC: 163 MG/DL
HBA1C MFR BLD: 7.3 % (ref 0–5.6)

## 2018-09-18 NOTE — TELEPHONE ENCOUNTER
See previous result note  (1) please notify patient's wife that his A1c diabetes control is improved, now 7.3% decreased from 7.6%, have the patient continue to work on improving his diet

## 2018-09-18 NOTE — TELEPHONE ENCOUNTER
Please notify patient's wife with results (Jealyn)  (1) his anemia or low blood count is stable  (2) he still has excess iron which is unchanged  (3) normal liver function and kidney function  (4) diabetes blood test is pending, will notify her of the results

## 2018-11-13 DIAGNOSIS — E78.5 DYSLIPIDEMIA: Chronic | ICD-10-CM

## 2018-11-13 DIAGNOSIS — R73.09 IMPAIRED GLUCOSE METABOLISM: ICD-10-CM

## 2018-11-13 RX ORDER — PRAVASTATIN SODIUM 10 MG
10 TABLET ORAL DAILY
Qty: 90 TAB | Refills: 3 | Status: SHIPPED | OUTPATIENT
Start: 2018-11-13 | End: 2019-06-28 | Stop reason: SDUPTHER

## 2018-11-13 NOTE — TELEPHONE ENCOUNTER
Metformin  Pravastain   90 day Rx to [facility]    Was the patient seen in the last year in this department? Yes    Does patient have an active prescription for medications requested? No     Received Request Via: Patient

## 2019-01-21 ENCOUNTER — HOSPITAL ENCOUNTER (OUTPATIENT)
Dept: LAB | Facility: MEDICAL CENTER | Age: 79
End: 2019-01-21
Attending: INTERNAL MEDICINE
Payer: MEDICARE

## 2019-01-21 ENCOUNTER — OFFICE VISIT (OUTPATIENT)
Dept: MEDICAL GROUP | Facility: MEDICAL CENTER | Age: 79
End: 2019-01-21
Payer: MEDICARE

## 2019-01-21 VITALS
WEIGHT: 149 LBS | BODY MASS INDEX: 26.4 KG/M2 | DIASTOLIC BLOOD PRESSURE: 62 MMHG | SYSTOLIC BLOOD PRESSURE: 140 MMHG | HEIGHT: 63 IN | OXYGEN SATURATION: 97 % | HEART RATE: 79 BPM | TEMPERATURE: 98.2 F

## 2019-01-21 DIAGNOSIS — E11.9 DIABETES MELLITUS TYPE 2, DIET-CONTROLLED (HCC): Chronic | ICD-10-CM

## 2019-01-21 DIAGNOSIS — D64.9 ANEMIA, UNSPECIFIED TYPE: Chronic | ICD-10-CM

## 2019-01-21 DIAGNOSIS — E78.5 DYSLIPIDEMIA: Chronic | ICD-10-CM

## 2019-01-21 DIAGNOSIS — E83.19 IRON OVERLOAD: Chronic | ICD-10-CM

## 2019-01-21 DIAGNOSIS — Z23 NEED FOR HEPATITIS B VACCINATION: ICD-10-CM

## 2019-01-21 DIAGNOSIS — Z00.00 PREVENTATIVE HEALTH CARE: Chronic | ICD-10-CM

## 2019-01-21 LAB
ALBUMIN SERPL BCP-MCNC: 4.2 G/DL (ref 3.2–4.9)
ALBUMIN/GLOB SERPL: 1.5 G/DL
ALP SERPL-CCNC: 54 U/L (ref 30–99)
ALT SERPL-CCNC: 14 U/L (ref 2–50)
ANION GAP SERPL CALC-SCNC: 6 MMOL/L (ref 0–11.9)
AST SERPL-CCNC: 21 U/L (ref 12–45)
BASOPHILS # BLD AUTO: 0.8 % (ref 0–1.8)
BASOPHILS # BLD: 0.04 K/UL (ref 0–0.12)
BILIRUB SERPL-MCNC: 1.6 MG/DL (ref 0.1–1.5)
BUN SERPL-MCNC: 21 MG/DL (ref 8–22)
CALCIUM SERPL-MCNC: 9.4 MG/DL (ref 8.5–10.5)
CHLORIDE SERPL-SCNC: 105 MMOL/L (ref 96–112)
CHOLEST SERPL-MCNC: 117 MG/DL (ref 100–199)
CO2 SERPL-SCNC: 29 MMOL/L (ref 20–33)
CREAT SERPL-MCNC: 0.9 MG/DL (ref 0.5–1.4)
EOSINOPHIL # BLD AUTO: 0.05 K/UL (ref 0–0.51)
EOSINOPHIL NFR BLD: 1 % (ref 0–6.9)
ERYTHROCYTE [DISTWIDTH] IN BLOOD BY AUTOMATED COUNT: 40.2 FL (ref 35.9–50)
FASTING STATUS PATIENT QL REPORTED: NORMAL
FERRITIN SERPL-MCNC: 1460.4 NG/ML (ref 22–322)
GLOBULIN SER CALC-MCNC: 2.8 G/DL (ref 1.9–3.5)
GLUCOSE SERPL-MCNC: 156 MG/DL (ref 65–99)
HCT VFR BLD AUTO: 35.4 % (ref 42–52)
HDLC SERPL-MCNC: 55 MG/DL
HGB BLD-MCNC: 11 G/DL (ref 14–18)
IMM GRANULOCYTES # BLD AUTO: 0.01 K/UL (ref 0–0.11)
IMM GRANULOCYTES NFR BLD AUTO: 0.2 % (ref 0–0.9)
IRON SATN MFR SERPL: 61 % (ref 15–55)
IRON SERPL-MCNC: 176 UG/DL (ref 50–180)
LDLC SERPL CALC-MCNC: 49 MG/DL
LYMPHOCYTES # BLD AUTO: 1.85 K/UL (ref 1–4.8)
LYMPHOCYTES NFR BLD: 37.9 % (ref 22–41)
MCH RBC QN AUTO: 24 PG (ref 27–33)
MCHC RBC AUTO-ENTMCNC: 31.1 G/DL (ref 33.7–35.3)
MCV RBC AUTO: 77.3 FL (ref 81.4–97.8)
MONOCYTES # BLD AUTO: 0.38 K/UL (ref 0–0.85)
MONOCYTES NFR BLD AUTO: 7.8 % (ref 0–13.4)
NEUTROPHILS # BLD AUTO: 2.55 K/UL (ref 1.82–7.42)
NEUTROPHILS NFR BLD: 52.3 % (ref 44–72)
NRBC # BLD AUTO: 0 K/UL
NRBC BLD-RTO: 0 /100 WBC
PLATELET # BLD AUTO: 186 K/UL (ref 164–446)
PMV BLD AUTO: 11.4 FL (ref 9–12.9)
POTASSIUM SERPL-SCNC: 4.3 MMOL/L (ref 3.6–5.5)
PROT SERPL-MCNC: 7 G/DL (ref 6–8.2)
RBC # BLD AUTO: 4.58 M/UL (ref 4.7–6.1)
SODIUM SERPL-SCNC: 140 MMOL/L (ref 135–145)
TIBC SERPL-MCNC: 290 UG/DL (ref 250–450)
TRIGL SERPL-MCNC: 63 MG/DL (ref 0–149)
WBC # BLD AUTO: 4.9 K/UL (ref 4.8–10.8)

## 2019-01-21 PROCEDURE — 83036 HEMOGLOBIN GLYCOSYLATED A1C: CPT

## 2019-01-21 PROCEDURE — 80061 LIPID PANEL: CPT

## 2019-01-21 PROCEDURE — 80053 COMPREHEN METABOLIC PANEL: CPT

## 2019-01-21 PROCEDURE — G0010 ADMIN HEPATITIS B VACCINE: HCPCS | Performed by: INTERNAL MEDICINE

## 2019-01-21 PROCEDURE — 83550 IRON BINDING TEST: CPT

## 2019-01-21 PROCEDURE — 90746 HEPB VACCINE 3 DOSE ADULT IM: CPT | Performed by: INTERNAL MEDICINE

## 2019-01-21 PROCEDURE — 83540 ASSAY OF IRON: CPT

## 2019-01-21 PROCEDURE — 36415 COLL VENOUS BLD VENIPUNCTURE: CPT

## 2019-01-21 PROCEDURE — 82728 ASSAY OF FERRITIN: CPT

## 2019-01-21 PROCEDURE — 85025 COMPLETE CBC W/AUTO DIFF WBC: CPT

## 2019-01-21 PROCEDURE — 99213 OFFICE O/P EST LOW 20 MIN: CPT | Mod: 25 | Performed by: INTERNAL MEDICINE

## 2019-01-21 RX ORDER — AZITHROMYCIN 250 MG/1
TABLET, FILM COATED ORAL
Qty: 6 TAB | Refills: 0 | Status: SHIPPED | OUTPATIENT
Start: 2019-01-21 | End: 2019-01-26

## 2019-01-21 ASSESSMENT — PATIENT HEALTH QUESTIONNAIRE - PHQ9: CLINICAL INTERPRETATION OF PHQ2 SCORE: 0

## 2019-01-21 NOTE — PROGRESS NOTES
Subjective:      Jane Valentin is a 78 y.o. male who presents with Follow-Up (blood sugar)            HPI   Patient here with his wife, patient speaks mostly Chinese but wife is able to translate, diabetes on metformin twice daily does not check blood sugars on a regular basis, no symptoms of hypoglycemia, tries to minimize sweets, processed foods  Dyslipidemia on pravastatin no muscle pain or muscle ache related to statin therapy  History iron overload has declined phlebotomy no joint pain, no shortness of breath, no history of CHF.  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  No soda   Two etoh daily, no tobacco  No difficulty with balance, no falls, no NSAIDs  Upcoming trip to [place] in March      Current Outpatient Prescriptions   Medication Sig Dispense Refill   • metFORMIN (GLUCOPHAGE) 500 MG Tab Take 1 Tab by mouth 2 times a day, with meals. 180 Tab 3   • pravastatin (PRAVACHOL) 10 MG Tab Take 1 Tab by mouth every day. 90 Tab 3   • aspirin (ASA) 81 MG Chew Tab chewable tablet Take 1 Tab by mouth every day. (Patient not taking: Reported on 1/21/2019) 100 Tab 11     No current facility-administered medications for this visit.        Preventative health  7/23/12 tdap  10/29/12 zoster vaccine  4/1/13 pneumovax vaccine  7/28/14  surgery procedure note robotic low anterior resection for unresectable lesion by colonoscopy  6/25/15 colon per [facility] anastomotic stricture 15 cm, estimated diameter 30 mm repeat 3 years  10/28/15 prevnar at [facility]  10/10/16 hep b first in series  10/14/16 FIT negative  4/17/17 psa 1.6  5/21/8 hep b second in series   5/21/18 declines shingrix      Iron overload  6/09 iron 197,saturation 71%,ferritin 1556,normal hemoglobin hematocrit, normal AST, ALT. Patient is adopted no family history of hemochromatosis. We'll repeat labs plus hemochromatosis genetic testing one month, at this point I am considering recommending phlebotomy once every 2-3 months  however his hematocrit is already at 39 which I think is reasonable given the fact that he has no other manifestations of disease such as hepatitis, diabetes, cardiomyopathy.  8/09 iron 202,%sat 86,ferritin 1805, AST 21,ALT 21  9/09 HH negative for C282Y,H63D,S65C  9/09 called patient recommend consider liver biopsy for definitive diagnosis hemochromatosis given negative genetic testing, we will discuss with him, other option would be to proceed with phlebotomy to get hemoglobin less than 12, although without a definitive diagnosis for iron stores liver or genetic testing for hemochromatosis this would not be an ideal treatment situation.  11/16/09 set up for phlebotomy had done 1x, refuses more, refuses liver bx  7/10 iron 231,ferritin 2198,%sat 95,TIBC 242; AST 25,ALT 30; hgb 12,hct 37  11/10 iron 178,ferritin 1997,%sat 74,AST 22,ALT 22,hgb 12.4,hct 37.5,  6/11 iron 180,ferritin 1479,%sat 65,AST 23,ALT 24,hgb 12.4,hct 39,  9/11 iron 113,ferritin 1423,%sat 42,AST 21,ALT 25,hgb 12.6,hct 37.6,; pt cont to decline liver bx, phlebotomy  4/12 iron 192,ferritin 1355,%sat 68,AST 25,ALT 30,hgb 12,hct 37.5; decline phlebotomy  7/12 iron 121,%sat 46,AST 22,ALT 21  10/12 iron 146,ferritin 1367,%sat 52,AST 25,ALT 26; declines phlebotomy  4/1/13 AST 20,ALT 23, ferritin 1312, %sat 45 iron 122, hgb 13,hct 39, mcv 75  1/27/14 AST 21,ALT 21, ferritin 1267, %sat 55, iron 157, hgb 11.8, hct 36, mcv 73  6/2/14 AST 20,ALT 20, ferritin 1275, %sat   Iron hgb 11.2, hct 33.6,mcv 73  11/18/14 AST 17,ALT 17,hgb 11.5,hct 36,mcv 75,iron 72,%sat 24,ferritin 1229  4/28/15 AST 21,ALT 20,hgb 11.2,hct 35,mcv 76,iron 178,%sat 67,ferritin 1316  10/28/15 iron 180,%sat 64,ferritin cancelled,AST 20,ALT 21,hemochromatosis C282Y negative, H63D negative, S56C negative  1/11/16 iron  6/6/16 hgb 11.3,hct 33.8,mcv 74,iron 132,%sat 45,ferritin 139,AST 21,ALT 18 21,%sat 8,ferritin 1414,hgb 11.4,hct 35,mcv 74,AST 19,ALT 19  9/10/16 AST 22,ALT  20,hgb 10.5,hct 31.6,mcv 75  10/11/16 AST 21,ALT 20,alk phos 66,bili 1.8  4/17/17 AST 18,ALT 17,iron 222,ferritin 1441,,hgb 11.3,hct 35,mcv 74  10/23/17 AST 20,ALT 17,iron 187,%sat 63, ferritin 1361, hgb 11.6,hct 36, A1c 7.2%  2/27/18 AST 17,ALT 18,iron 70,%sat 24,ferritin 1200,hgb 11.3,hct 34.5  5/21/18 AST 18,ALT 18,iron 148,%sat 46,ferritin 1566,hgb 12.3,hct 38  9/17/18 AST 20,ALT 16,hgb 11,hct 34,iron 169,ferritin 55%,ferritin 1451     Dyslipidemia  4/08 chol 183,trig 114,hdl 66,ldl 94  7/10 chol 167,trig 99,hdl 50,ldl 97  10/12 chol 183,trig 67,hdl 51,ldl 119  1/27/14 chol 170,trig 53,hdl 55,ldl 104  4/28/15 chol 159,trig 77,hdl 52,ldl 92  10/28/15 chol 185,trig 68,hdl 59,ldl 112  1/11/16 chol 172,trig 62,hdl 61,ldl 99 start pravachol 10 mg (diabetes)  6/6/16 chol 158,trig 63,hdl 58,ldl 87 on pravachol 10 mg  4/17/17 change to pravachol 20 mg 1/2 per day  4/17/17 chol 134,trig 61,hdl 60,ldl 62 on pravachol 20 mg take 1/2 per day  10/23/17 chol 130,trig 57,hdl 59,ldl 60 on pravachol 20 mg take 1/2 per day  5/21/18 on pravachol 10 mg daily  9/17/18 chol 133,trig 65,hdl 56,ldl 64 on pravachol 10 mg     Diabetes  8/09 blood sugar 126 likely related to hemochromatosis  7/10 bs 117,A1c 6.6%  11/10 bs 111  9/11 bs 125  4/12 A1c 6.5%,bs 142  7/12 A1c 6.3%,bs 122  10/12 A1c 6.9%,bs 132 pt declines fingerstick bs testing  4/1/13 A1c 6.6%; bs 137  pt declines fingerstick bs testing  1/27/14 A1c 6.7%, urine mac 30; declines to check his own blood sugar, declines diabetes education  6/2/14 A1c 6.3% no meds, declines to check blood sugar  11/18/14 A1c 6.9% no meds, declines to check blood sugars  11/19/14 start metformin 500 mg qday, patient declines checking blood sugars, repeat labs three months  11/19/14  eye exam cataract  2/26/15 not taking metformin, recommend take 500 mg daily  4/28/15 A1c 7.1%, bs 171,urine mac 18, not taking metformin  6/1/15 declines diabetes classes, ACE, statin, asa    6/29/15  resume metformin 500 mg bid  10/19/15 diabetes education classes referral  10/28/15 A1c 6.8%, bs 141 on metformin 500 mg once per day, not checking sugars, diabetes classes pending  1/11/16 A1c 6.9% on metformin 500 mg qday, declines ACE, start pravachol 10 mg  6/6/16 A1c 6.6% on metformin 500 mg qday  10/11/16 A1c 7% on metformin 500 mg qday declines ACE, I recommend asa 81 mg  12/5/16  eye exam bilateral cataract, vitreous degeneration  4/17/17 A1c 7.2% on metformin 500 mg qday declines diabetic classes, education, check blood sugars, and ACE inhibitor; remains on metformin 500 mg daily  10/23/17 A1c 7.2% on metformin 500 mg qday, not checking blood sugar regularly, has iron overload hemochromatosis likely, but declines liver biopsy or phlebotomy  2/27/18 A1c 7.1% on metformin 500 mg  4/2/18  eye exam, no evidence of diabetic retinopathy  5/22/18 A1c 7.6% on metformin 500 mg qday increase to bid   9/17/18 A1c 7.3% on metformin 500 mg qday     BPH  6/20/11 trial of flomax  9/11 psa 1.2  10/12 flomax resume and start proscar  10/12 psa 1.4  4/1/13 off flomax and proscar     Arthritis left knee     Anemia  11/08 hgb 12.2,hct 35.6, mcv 73  6/09  Hgb 12.7, hct 39.2, mcv 77, normal B12 and folate, iron overload see note  7/10 hgb 12,hct 37  11/10 hgb 12.4,hct 37.5  9/11 hgb 12.6,hct 37.6,mcv 75  4/12 hgb 12,hct 37.5  7/12 hgb 11.9,hct 37,mcv 75  10/29/12 hgb 12.3,hct 36.2,mcv 73;iron 146,ferritin 1367,%sat 52,ESR 18, B12 383,folate 13; SPEP gamma globulin 1.6, no M-spike noted; FIT ordered  4/1/13 hgb 13,hct 39,mcv 75; iron 122,ferritin 1312,%sat 45  1/27/14 hgb 11.8,hct 36,mcv 73,iron 157,ferritin 1267,%sat 55; pt declines referral evaluate liver biopsy  6/2/14 hgb 11.2,hct 33.6,mcv 73,iron 165,%sat 63,ferritin 1275, b12 318, folate 11.7,adj retic 1.1%; SPEP negative  11/18/14 hgb 11.5,hct 36,mcv 75,iron 72,%sat 24,ferritin 1229  4/28/15 hgb 11.2,hct 35,mcv 96,iron 178,%sat 67,ferritin 1316  6/25/15  colon per [facility] anastomotic stricture 15 cm, estimated diameter 30 mm repeat 3 years  10/28/15 hgb 11.7,hct 36,mcv 74,iron 180,%sat 64  1/11/16 hgb 11.4,hct 35,mcv 74,iron 21,%sat 8,ferritin 1414,SPEP negative  6/6/16 hgb 11.3,hct 33.8,mcv 74,iron 132,%sat 45,ferritin 1392,b12 337,folate 12,,retic 1.8,SPEP negative  9/10/16 hgb 10.5,hct 31.6,mcv 75  10/11/16 hgb 10.9,hct 33,mcv 75,iron 135,%sat 43,ferritin 1457  10/14/16 FIT negative  4/17/17 hgb 11.3,hct 35,mcv 74.5,iron 222,ferrritin 1441,,b12 362,folate 11,SPEP negative  10/23/17 hgb 11.6,hct 36,mcv 74.9,iron 187,%sat 63, ferritin 1361 likely hemachromatosis declines liver biopsy or phlebotomy  5/21/18 hgb 12.3,hct 38,mcv 74.5,iron 148,%sat 46,ferritin 1566  9/17/18 hgb 11,hct 34,mcv 76 iron 169,%sat 55,ferritin 1451     Adenoma  9/26/07 colon per [facility] negative mixed adenoma polyp  7/2/14 colon per [facility] tubular adenoma x 3 and tubulovillous adenoma, repeat colonoscopy 6 months  7/28/14  surgery procedure note robotic low anterior resection for unresectable lesion by colonoscopy  6/25/15 colon per [facility] anastomotic stricture 15 cm, estimated diameter 30 mm repeat 3 years             Patient Active Problem List   Diagnosis   • Dyslipidemia   • Anemia   • Preventative health care   • Iron overload   • Diabetes mellitus type 2, diet-controlled (HCC)   • BPH (benign prostatic hypertrophy)   • Adenomatous colon polyp   • Arthritis of knee   • History of tobacco abuse     Depression Screening    Little interest or pleasure in doing things?  0 - not at all  Feeling down, depressed , or hopeless? 0 - not at all  Patient Health Questionnaire Score: 0            Health Maintenance Summary                IMM ZOSTER VACCINES Overdue 12/24/2012      Done 10/29/2012 Imm Admin: Zoster Vaccine Live (ZVL) (Zostavax)    URINE ACR / MICROALBUMIN Overdue 4/17/2018      Done 4/17/2017 MICROALBUMIN CREAT RATIO URINE      Patient has more history with this topic...     "DIABETES MONOFILAMENT / LE EXAM Overdue 4/17/2018      Done 4/17/2017 AMB DIABETIC MONOFILAMENT LOWER EXTREMITY EXAM     Patient has more history with this topic...    COLONOSCOPY Overdue 6/25/2018      Done 6/25/2015 AMB REFERRAL TO GI FOR COLONOSCOPY     Patient has more history with this topic...    IMM HEP B VACCINE Overdue 7/16/2018      Done 5/21/2018 Imm Admin: Hepatitis B Vaccine Recombivax (Adol/Adult)     Patient has more history with this topic...    Annual Wellness Visit Overdue 10/24/2018      Done 10/23/2017 Visit Dx: Medicare annual wellness visit, subsequent     Patient has more history with this topic...    A1C SCREENING Next Due 3/17/2019      Done 9/17/2018 HEMOGLOBIN A1C      Patient has more history with this topic...    RETINAL SCREENING Next Due 4/2/2019      Done 4/2/2018 REFERRAL FOR RETINAL SCREENING EXAM     Patient has more history with this topic...    FASTING LIPID PROFILE Next Due 9/17/2019      Done 9/17/2018 LIPID PROFILE     Patient has more history with this topic...    SERUM CREATININE Next Due 9/17/2019      Done 9/17/2018 COMP METABOLIC PANEL      Patient has more history with this topic...    IMM DTaP/Tdap/Td Vaccine Next Due 7/23/2022      Done 7/23/2012 Imm Admin: Tdap Vaccine          Patient Care Team:  Thomas Greenberg M.D. as PCP - General  Mariana Adhikari O.D. as Consulting Physician (Optometry)  River FIGUEROA M.D. (Inactive) as Consulting Physician (Gastroenterology)  Massimo Zamudio M.D. as Consulting Physician (Surgery)      ROS       Objective:     /62 (BP Location: Right arm, Patient Position: Sitting, BP Cuff Size: Adult)   Pulse 79   Temp 36.8 °C (98.2 °F)   Ht 1.6 m (5' 3\")   Wt 67.6 kg (149 lb)   SpO2 97%   BMI 26.39 kg/m²      Physical Exam   Constitutional: He appears well-developed and well-nourished. No distress.   HENT:   Head: Normocephalic and atraumatic.   Eyes: Conjunctivae are normal. Right eye exhibits no discharge. Left eye exhibits no " discharge.   Neck: Neck supple. No JVD present.   Cardiovascular: Normal rate and regular rhythm.    Pulmonary/Chest: Effort normal and breath sounds normal.   Abdominal: He exhibits no distension.   Musculoskeletal: He exhibits no edema.   Neurological: He is alert.   Skin: Skin is warm. He is not diaphoretic.   Psychiatric: He has a normal mood and affect. His behavior is normal.   Nursing note and vitals reviewed.              Assessment/Plan:     Assessment  #!  Diabetes mellitus, not checking blood sugar on metformin twice a day, no sodas, no sweets, still drinking alcohol 2/day, keeps active, no nephropathy, retinopathy, neuropathy    #2 dyslipidemia on statin no muscle pain or muscle aches    #3 iron overload has declined phlebotomy and liver biopsy, still drinking alcohol 2/day    #4 chronic anemia has been stable    Plan  #! Trip upcoming to [facility] will give zpack as needed    #2 hep b vaccine third in series     #3 labs    #4 recommend decrease alcohol intake alcohol increases risk of worsening blood sugar and liver inflammation    #5 check blood sugars daily and record    #6 follow-up 6 months

## 2019-01-22 LAB
EST. AVERAGE GLUCOSE BLD GHB EST-MCNC: 157 MG/DL
HBA1C MFR BLD: 7.1 % (ref 0–5.6)

## 2019-01-23 ENCOUNTER — TELEPHONE (OUTPATIENT)
Dept: MEDICAL GROUP | Facility: MEDICAL CENTER | Age: 79
End: 2019-01-23

## 2019-01-23 NOTE — TELEPHONE ENCOUNTER
Notified wife with results no changes, still with iron overload, patient has declined phlebotomy and biopsy, anemia stable, LFTs stable, A1c stable, no evidence of CHF on exam, will continue to monitor repeat 3 months

## 2019-06-28 DIAGNOSIS — R73.09 IMPAIRED GLUCOSE METABOLISM: ICD-10-CM

## 2019-06-28 DIAGNOSIS — E78.5 DYSLIPIDEMIA: Chronic | ICD-10-CM

## 2019-06-28 RX ORDER — PRAVASTATIN SODIUM 10 MG
10 TABLET ORAL DAILY
Qty: 100 TAB | Refills: 1 | Status: SHIPPED | OUTPATIENT
Start: 2019-06-28

## 2019-06-28 NOTE — TELEPHONE ENCOUNTER
Pravastatin   Metformin  100 day required    Was the patient seen in the last year in this department? Yes    Does patient have an active prescription for medications requested? No     Received Request Via: Pharmacy

## 2019-07-05 ENCOUNTER — TELEPHONE (OUTPATIENT)
Dept: MEDICAL GROUP | Facility: MEDICAL CENTER | Age: 79
End: 2019-07-05

## 2019-07-05 NOTE — TELEPHONE ENCOUNTER
Jane Valentin  [phone]    Pt called to say that she got a letter from insurance re: her medication. Faxed it to you for you to address. Please advise of status.

## 2019-07-15 ENCOUNTER — TELEPHONE (OUTPATIENT)
Dept: MEDICAL GROUP | Facility: MEDICAL CENTER | Age: 79
End: 2019-07-15

## 2019-07-15 NOTE — TELEPHONE ENCOUNTER
Left message for patient to call back regarding pre-visit planning. Please transfer call to [phone].

## 2019-07-15 NOTE — TELEPHONE ENCOUNTER
ESTABLISHED PATIENT PRE-VISIT PLANNING     Patient was NOT contacted to complete PVP.     Note: Patient will not be contacted if there is no indication to call.     1.  Reviewed notes from the last few office visits within the medical group: Yes    2.  If any orders were placed at last visit or intended to be done for this visit (i.e. 6 mos follow-up), do we have Results/Consult Notes?        •  Labs - Labs ordered, completed on 1/21/19 and results are in chart.   Note: If patient appointment is for lab review and patient did not complete labs, check with provider if OK to reschedule patient until labs completed.       •  Imaging - Imaging was not ordered at last office visit.       •  Referrals - No referrals were ordered at last office visit.    3. Is this appointment scheduled as a Hospital Follow-Up? No    4.  Immunizations were updated in [place] using WebIZ?: Yes       •  Web Iz Recommendations: SHINGRIX (Shingles)    5.  Patient is due for the following Health Maintenance Topics:   Health Maintenance Due   Topic Date Due   • IMM ZOSTER VACCINES (2 of 3) 12/24/2012   • URINE ACR / MICROALBUMIN  04/17/2018   • DIABETES MONOFILAMENT / LE EXAM  04/17/2018   • COLONOSCOPY  06/25/2018   • Annual Wellness Visit  10/24/2018   • RETINAL SCREENING  04/02/2019       Retinal exam results requested    6. Orders for overdue Health Maintenance topics pended in Pre-Charting? NO    7.  AHA (MDX) form printed for Provider? YES    8.  Patient was NOT informed to arrive 15 min prior to their scheduled appointment and bring in their medication bottles.

## 2019-07-15 NOTE — LETTER
[facility]  Thomas Greenberg M.D.  [address]  [place] [address]  Fax: [phone]   Authorization for Release/Disclosure of   Protected Health Information   Name: JANE MACIAS : 1940 SSN: [id number]   Address: [address] [place] [address]  [place] [address] Phone:    [phone] (home)    I authorize the entity listed below to release/disclose the PHI below to:   [facility]/Thomas Greenberg M.D. and Thomas Greenberg M.D.   Provider or Entity Name:  Massimo Zamudio M.D.   Address   [facility]   Phone:  [phone]    Fax:  [phone]   Reason for request: continuity of care   Information to be released:    [  ] LAST COLONOSCOPY,  including any PATH REPORT and follow-up  [  ] LAST FIT/COLOGUARD RESULT [  ] LAST DEXA  [  ] LAST MAMMOGRAM  [  ] LAST PAP  [  ] LAST LABS [XXX] RETINA EXAM REPORT  [  ] IMMUNIZATION RECORDS  [  ] Release all info      [  ] Check here and initial the line next to each item to release ALL health information INCLUDING  _____ Care and treatment for drug and / or alcohol abuse  _____ HIV testing, infection status, or AIDS  _____ Genetic Testing    DATES OF SERVICE OR TIME PERIOD TO BE DISCLOSED: _____________  I understand and acknowledge that:  * This Authorization may be revoked at any time by you in writing, except if your health information has already been used or disclosed.  * Your health information that will be used or disclosed as a result of you signing this authorization could be re-disclosed by the recipient. If this occurs, your re-disclosed health information may no longer be protected by State or Federal laws.  * You may refuse to sign this Authorization. Your refusal will not affect your ability to obtain treatment.  * This Authorization becomes effective upon signing and will  on (date) __________.      If no date is indicated, this Authorization will  one (1) year from the signature date.    Name: Jane Naik  Barbie    Signature:continuity of care   Date:     7/15/2019       PLEASE FAX REQUESTED RECORDS BACK TO: [phone]

## 2019-07-19 ENCOUNTER — TELEPHONE (OUTPATIENT)
Dept: MEDICAL GROUP | Facility: MEDICAL CENTER | Age: 79
End: 2019-07-19

## 2019-07-19 DIAGNOSIS — E78.5 DYSLIPIDEMIA: Chronic | ICD-10-CM

## 2019-07-19 DIAGNOSIS — E11.9 DIABETES MELLITUS TYPE 2, DIET-CONTROLLED (HCC): Chronic | ICD-10-CM

## 2019-07-19 DIAGNOSIS — Z12.5 PROSTATE CANCER SCREENING: ICD-10-CM

## 2019-07-19 DIAGNOSIS — R35.1 NOCTURIA: ICD-10-CM

## 2019-07-19 DIAGNOSIS — D64.9 ANEMIA, UNSPECIFIED TYPE: Chronic | ICD-10-CM

## 2019-07-19 DIAGNOSIS — E83.19 IRON OVERLOAD: Chronic | ICD-10-CM

## 2019-07-19 NOTE — TELEPHONE ENCOUNTER
Jane Valentin  [phone] (home)     Pt has appt 7/22 and would like labs done prior. Please order.

## 2019-07-20 NOTE — TELEPHONE ENCOUNTER
Noted, fasting blood test orders have been put into the computer system.  Please notify the patient's wife.

## 2019-07-22 ENCOUNTER — OFFICE VISIT (OUTPATIENT)
Dept: MEDICAL GROUP | Facility: MEDICAL CENTER | Age: 79
End: 2019-07-22
Payer: MEDICARE

## 2019-07-22 ENCOUNTER — HOSPITAL ENCOUNTER (OUTPATIENT)
Dept: LAB | Facility: MEDICAL CENTER | Age: 79
End: 2019-07-22
Attending: INTERNAL MEDICINE
Payer: MEDICARE

## 2019-07-22 VITALS
TEMPERATURE: 97.9 F | HEART RATE: 97 BPM | WEIGHT: 139 LBS | HEIGHT: 63 IN | OXYGEN SATURATION: 98 % | SYSTOLIC BLOOD PRESSURE: 132 MMHG | DIASTOLIC BLOOD PRESSURE: 70 MMHG | BODY MASS INDEX: 24.63 KG/M2

## 2019-07-22 DIAGNOSIS — Z00.00 PREVENTATIVE HEALTH CARE: Chronic | ICD-10-CM

## 2019-07-22 DIAGNOSIS — E11.9 DIABETES MELLITUS TYPE 2, DIET-CONTROLLED (HCC): Chronic | ICD-10-CM

## 2019-07-22 DIAGNOSIS — Z12.11 COLON CANCER SCREENING: ICD-10-CM

## 2019-07-22 DIAGNOSIS — D64.9 ANEMIA, UNSPECIFIED TYPE: Chronic | ICD-10-CM

## 2019-07-22 DIAGNOSIS — E78.5 DYSLIPIDEMIA: Chronic | ICD-10-CM

## 2019-07-22 DIAGNOSIS — Z12.5 PROSTATE CANCER SCREENING: ICD-10-CM

## 2019-07-22 DIAGNOSIS — E11.9 DIABETES MELLITUS WITHOUT COMPLICATION (HCC): ICD-10-CM

## 2019-07-22 DIAGNOSIS — E83.19 IRON OVERLOAD: Chronic | ICD-10-CM

## 2019-07-22 DIAGNOSIS — R35.1 NOCTURIA: ICD-10-CM

## 2019-07-22 LAB
ALBUMIN SERPL BCP-MCNC: 4 G/DL (ref 3.2–4.9)
ALBUMIN/GLOB SERPL: 1.3 G/DL
ALP SERPL-CCNC: 78 U/L (ref 30–99)
ALT SERPL-CCNC: 14 U/L (ref 2–50)
ANION GAP SERPL CALC-SCNC: 8 MMOL/L (ref 0–11.9)
AST SERPL-CCNC: 19 U/L (ref 12–45)
BASOPHILS # BLD AUTO: 1 % (ref 0–1.8)
BASOPHILS # BLD: 0.06 K/UL (ref 0–0.12)
BILIRUB SERPL-MCNC: 1 MG/DL (ref 0.1–1.5)
BUN SERPL-MCNC: 19 MG/DL (ref 8–22)
CALCIUM SERPL-MCNC: 9 MG/DL (ref 8.5–10.5)
CHLORIDE SERPL-SCNC: 104 MMOL/L (ref 96–112)
CHOLEST SERPL-MCNC: 134 MG/DL (ref 100–199)
CO2 SERPL-SCNC: 27 MMOL/L (ref 20–33)
CREAT SERPL-MCNC: 0.97 MG/DL (ref 0.5–1.4)
EOSINOPHIL # BLD AUTO: 0.16 K/UL (ref 0–0.51)
EOSINOPHIL NFR BLD: 2.6 % (ref 0–6.9)
ERYTHROCYTE [DISTWIDTH] IN BLOOD BY AUTOMATED COUNT: 40.2 FL (ref 35.9–50)
EST. AVERAGE GLUCOSE BLD GHB EST-MCNC: 160 MG/DL
FASTING STATUS PATIENT QL REPORTED: NORMAL
FERRITIN SERPL-MCNC: 1443.9 NG/ML (ref 22–322)
FOLATE SERPL-MCNC: 8.5 NG/ML
GLOBULIN SER CALC-MCNC: 3.1 G/DL (ref 1.9–3.5)
GLUCOSE SERPL-MCNC: 154 MG/DL (ref 65–99)
HBA1C MFR BLD: 7.2 % (ref 0–5.6)
HCT VFR BLD AUTO: 32.4 % (ref 42–52)
HDLC SERPL-MCNC: 38 MG/DL
HGB BLD-MCNC: 10.1 G/DL (ref 14–18)
HGB RETIC QN AUTO: 26.2 PG/CELL (ref 29–35)
IMM GRANULOCYTES # BLD AUTO: 0.02 K/UL (ref 0–0.11)
IMM GRANULOCYTES NFR BLD AUTO: 0.3 % (ref 0–0.9)
IMM RETICS NFR: 32.9 % (ref 9.3–17.4)
IRON SATN MFR SERPL: 60 % (ref 15–55)
IRON SERPL-MCNC: 151 UG/DL (ref 50–180)
LDH SERPL L TO P-CCNC: 196 U/L (ref 107–266)
LDLC SERPL CALC-MCNC: 69 MG/DL
LYMPHOCYTES # BLD AUTO: 2.15 K/UL (ref 1–4.8)
LYMPHOCYTES NFR BLD: 34.5 % (ref 22–41)
MCH RBC QN AUTO: 23.3 PG (ref 27–33)
MCHC RBC AUTO-ENTMCNC: 31.2 G/DL (ref 33.7–35.3)
MCV RBC AUTO: 74.8 FL (ref 81.4–97.8)
MONOCYTES # BLD AUTO: 0.51 K/UL (ref 0–0.85)
MONOCYTES NFR BLD AUTO: 8.2 % (ref 0–13.4)
NEUTROPHILS # BLD AUTO: 3.33 K/UL (ref 1.82–7.42)
NEUTROPHILS NFR BLD: 53.4 % (ref 44–72)
NRBC # BLD AUTO: 0 K/UL
NRBC BLD-RTO: 0 /100 WBC
PLATELET # BLD AUTO: 275 K/UL (ref 164–446)
PMV BLD AUTO: 10.1 FL (ref 9–12.9)
POTASSIUM SERPL-SCNC: 4.5 MMOL/L (ref 3.6–5.5)
PROT SERPL-MCNC: 7.1 G/DL (ref 6–8.2)
PSA SERPL-MCNC: 2.69 NG/ML (ref 0–4)
RBC # BLD AUTO: 4.33 M/UL (ref 4.7–6.1)
RETICS # AUTO: 0.06 M/UL (ref 0.04–0.06)
RETICS/RBC NFR: 1.4 % (ref 0.8–2.1)
SODIUM SERPL-SCNC: 139 MMOL/L (ref 135–145)
TIBC SERPL-MCNC: 252 UG/DL (ref 250–450)
TRIGL SERPL-MCNC: 134 MG/DL (ref 0–149)
TSH SERPL DL<=0.005 MIU/L-ACNC: 0.69 UIU/ML (ref 0.38–5.33)
VIT B12 SERPL-MCNC: 486 PG/ML (ref 211–911)
WBC # BLD AUTO: 6.2 K/UL (ref 4.8–10.8)

## 2019-07-22 PROCEDURE — 83615 LACTATE (LD) (LDH) ENZYME: CPT

## 2019-07-22 PROCEDURE — 84443 ASSAY THYROID STIM HORMONE: CPT

## 2019-07-22 PROCEDURE — 83036 HEMOGLOBIN GLYCOSYLATED A1C: CPT

## 2019-07-22 PROCEDURE — 84160 ASSAY OF PROTEIN ANY SOURCE: CPT

## 2019-07-22 PROCEDURE — 36415 COLL VENOUS BLD VENIPUNCTURE: CPT

## 2019-07-22 PROCEDURE — 82728 ASSAY OF FERRITIN: CPT

## 2019-07-22 PROCEDURE — 83550 IRON BINDING TEST: CPT

## 2019-07-22 PROCEDURE — 82607 VITAMIN B-12: CPT

## 2019-07-22 PROCEDURE — 99213 OFFICE O/P EST LOW 20 MIN: CPT | Performed by: INTERNAL MEDICINE

## 2019-07-22 PROCEDURE — 82746 ASSAY OF FOLIC ACID SERUM: CPT

## 2019-07-22 PROCEDURE — 85046 RETICYTE/HGB CONCENTRATE: CPT

## 2019-07-22 PROCEDURE — 84153 ASSAY OF PSA TOTAL: CPT

## 2019-07-22 PROCEDURE — 83540 ASSAY OF IRON: CPT

## 2019-07-22 PROCEDURE — 80061 LIPID PANEL: CPT

## 2019-07-22 PROCEDURE — 85025 COMPLETE CBC W/AUTO DIFF WBC: CPT

## 2019-07-22 PROCEDURE — 80053 COMPREHEN METABOLIC PANEL: CPT

## 2019-07-22 PROCEDURE — 84165 PROTEIN E-PHORESIS SERUM: CPT

## 2019-07-22 NOTE — PROGRESS NOTES
Subjective:      Jane Valentin is a 78 y.o. male who presents with follow up sugars        HPI   Wife here to translate with patient who arrived at 1008, this is not a wellness visit. States that 3 days fell down at home and fell forward in yard , doing better with no pain, was working in his yard.  No history of recurrent falls.  Just tripped over something.  No head trauma, no loss of consciousness, skinned his knee.  No chest pain, palpitations, lightheadedness.  On metformin but does not check blood sugar, dyslipidemia on pravastatin, history of iron overload, has cut back alcohol to 1/day.  Trying to limit sodas.         Current Outpatient Prescriptions   Medication Sig Dispense Refill   • metFORMIN (GLUCOPHAGE) 500 MG Tab Take 1 Tab by mouth 2 times a day, with meals. 200 Tab 1   • pravastatin (PRAVACHOL) 10 MG Tab Take 1 Tab by mouth every day. 100 Tab 1   • aspirin (ASA) 81 MG Chew Tab chewable tablet Take 1 Tab by mouth every day. (Patient not taking: Reported on 1/21/2019) 100 Tab 11     No current facility-administered medications for this visit.        Preventative health  7/23/12 tdap  10/29/12 zoster vaccine  4/1/13 pneumovax vaccine  7/28/14  surgery procedure note robotic low anterior resection for unresectable lesion by colonoscopy  6/25/15 colon per [facility] anastomotic stricture 15 cm, estimated diameter 30 mm repeat 3 years  10/28/15 prevnar at [facility]  10/10/16 hep b first in series  10/14/16 FIT negative  4/17/17 psa 1.6  5/21/8 hep b second in series   5/21/18 declines shingrix   1/21/19 hep b third in series      Iron overload  6/09 iron 197,saturation 71%,ferritin 1556,normal hemoglobin hematocrit, normal AST, ALT. Patient is adopted no family history of hemochromatosis. We'll repeat labs plus hemochromatosis genetic testing one month, at this point I am considering recommending phlebotomy once every 2-3 months however his hematocrit is already at 39 which I think is reasonable given the  fact that he has no other manifestations of disease such as hepatitis, diabetes, cardiomyopathy.  8/09 iron 202,%sat 86,ferritin 1805, AST 21,ALT 21  9/09 HH negative for C282Y,H63D,S65C  9/09 called patient recommend consider liver biopsy for definitive diagnosis hemochromatosis given negative genetic testing, we will discuss with him, other option would be to proceed with phlebotomy to get hemoglobin less than 12, although without a definitive diagnosis for iron stores liver or genetic testing for hemochromatosis this would not be an ideal treatment situation.  11/16/09 set up for phlebotomy had done 1x, refuses more, refuses liver bx  7/10 iron 231,ferritin 2198,%sat 95,TIBC 242; AST 25,ALT 30; hgb 12,hct 37  11/10 iron 178,ferritin 1997,%sat 74,AST 22,ALT 22,hgb 12.4,hct 37.5,  6/11 iron 180,ferritin 1479,%sat 65,AST 23,ALT 24,hgb 12.4,hct 39,  9/11 iron 113,ferritin 1423,%sat 42,AST 21,ALT 25,hgb 12.6,hct 37.6,; pt cont to decline liver bx, phlebotomy  4/12 iron 192,ferritin 1355,%sat 68,AST 25,ALT 30,hgb 12,hct 37.5; decline phlebotomy  7/12 iron 121,%sat 46,AST 22,ALT 21  10/12 iron 146,ferritin 1367,%sat 52,AST 25,ALT 26; declines phlebotomy  4/1/13 AST 20,ALT 23, ferritin 1312, %sat 45 iron 122, hgb 13,hct 39, mcv 75  1/27/14 AST 21,ALT 21, ferritin 1267, %sat 55, iron 157, hgb 11.8, hct 36, mcv 73  6/2/14 AST 20,ALT 20, ferritin 1275, %sat   Iron hgb 11.2, hct 33.6,mcv 73  11/18/14 AST 17,ALT 17,hgb 11.5,hct 36,mcv 75,iron 72,%sat 24,ferritin 1229  4/28/15 AST 21,ALT 20,hgb 11.2,hct 35,mcv 76,iron 178,%sat 67,ferritin 1316  10/28/15 iron 180,%sat 64,ferritin cancelled,AST 20,ALT 21,hemochromatosis C282Y negative, H63D negative, S56C negative  1/11/16 iron  6/6/16 hgb 11.3,hct 33.8,mcv 74,iron 132,%sat 45,ferritin 139,AST 21,ALT 18 21,%sat 8,ferritin 1414,hgb 11.4,hct 35,mcv 74,AST 19,ALT 19  9/10/16 AST 22,ALT 20,hgb 10.5,hct 31.6,mcv 75  10/11/16 AST 21,ALT 20,alk phos 66,bili  1.8  4/17/17 AST 18,ALT 17,iron 222,ferritin 1441,,hgb 11.3,hct 35,mcv 74  10/23/17 AST 20,ALT 17,iron 187,%sat 63, ferritin 1361, hgb 11.6,hct 36, A1c 7.2%  2/27/18 AST 17,ALT 18,iron 70,%sat 24,ferritin 1200,hgb 11.3,hct 34.5  5/21/18 AST 18,ALT 18,iron 148,%sat 46,ferritin 1566,hgb 12.3,hct 38  9/17/18 AST 20,ALT 16,hgb 11,hct 34,iron 169,ferritin 55%,ferritin 1451  1/22/19 AST 21,ALT 14,hgb 11,hct 35,iron 176,%sat 61,ferritin 1460     Dyslipidemia  4/08 chol 183,trig 114,hdl 66,ldl 94  7/10 chol 167,trig 99,hdl 50,ldl 97  10/12 chol 183,trig 67,hdl 51,ldl 119  1/27/14 chol 170,trig 53,hdl 55,ldl 104  4/28/15 chol 159,trig 77,hdl 52,ldl 92  10/28/15 chol 185,trig 68,hdl 59,ldl 112  1/11/16 chol 172,trig 62,hdl 61,ldl 99 start pravachol 10 mg (diabetes)  6/6/16 chol 158,trig 63,hdl 58,ldl 87 on pravachol 10 mg  4/17/17 change to pravachol 20 mg 1/2 per day  4/17/17 chol 134,trig 61,hdl 60,ldl 62 on pravachol 20 mg take 1/2 per day  10/23/17 chol 130,trig 57,hdl 59,ldl 60 on pravachol 20 mg take 1/2 per day  5/21/18 on pravachol 10 mg daily  9/17/18 chol 133,trig 65,hdl 56,ldl 64 on pravachol 10 mg  1/22/19 chol 117,trig 63,hdl 55,ldl 49 on pravachol 10 mg     Diabetes  8/09 blood sugar 126 likely related to hemochromatosis  7/10 bs 117,A1c 6.6%  11/10 bs 111  9/11 bs 125  4/12 A1c 6.5%,bs 142  7/12 A1c 6.3%,bs 122  10/12 A1c 6.9%,bs 132 pt declines fingerstick bs testing  4/1/13 A1c 6.6%; bs 137  pt declines fingerstick bs testing  1/27/14 A1c 6.7%, urine mac 30; declines to check his own blood sugar, declines diabetes education  6/2/14 A1c 6.3% no meds, declines to check blood sugar  11/18/14 A1c 6.9% no meds, declines to check blood sugars  11/19/14 start metformin 500 mg qday, patient declines checking blood sugars, repeat labs three months  11/19/14  eye exam cataract  2/26/15 not taking metformin, recommend take 500 mg daily  4/28/15 A1c 7.1%, bs 171,urine mac 18, not taking metformin  6/1/15  declines diabetes classes, ACE, statin, asa    6/29/15 resume metformin 500 mg bid  10/19/15 diabetes education classes referral  10/28/15 A1c 6.8%, bs 141 on metformin 500 mg once per day, not checking sugars, diabetes classes pending  1/11/16 A1c 6.9% on metformin 500 mg qday, declines ACE, start pravachol 10 mg  6/6/16 A1c 6.6% on metformin 500 mg qday  10/11/16 A1c 7% on metformin 500 mg qday declines ACE, I recommend asa 81 mg  12/5/16  eye exam bilateral cataract, vitreous degeneration  4/17/17 A1c 7.2% on metformin 500 mg qday declines diabetic classes, education, check blood sugars, and ACE inhibitor; remains on metformin 500 mg daily  10/23/17 A1c 7.2% on metformin 500 mg qday, not checking blood sugar regularly, has iron overload hemochromatosis likely, but declines liver biopsy or phlebotomy  2/27/18 A1c 7.1% on metformin 500 mg  4/2/18  eye exam, no evidence of diabetic retinopathy  5/22/18 A1c 7.6% on metformin 500 mg qday increase to bid   9/17/18 A1c 7.3% on metformin 500 mg qday  1/22/19 A1c 7.1% on metformin 500 mg bid     BPH  6/20/11 trial of flomax  9/11 psa 1.2  10/12 flomax resume and start proscar  10/12 psa 1.4  4/1/13 off flomax and proscar     Arthritis left knee     Anemia  11/08 hgb 12.2,hct 35.6, mcv 73  6/09  Hgb 12.7, hct 39.2, mcv 77, normal B12 and folate, iron overload see note  7/10 hgb 12,hct 37  11/10 hgb 12.4,hct 37.5  9/11 hgb 12.6,hct 37.6,mcv 75  4/12 hgb 12,hct 37.5  7/12 hgb 11.9,hct 37,mcv 75  10/29/12 hgb 12.3,hct 36.2,mcv 73;iron 146,ferritin 1367,%sat 52,ESR 18, B12 383,folate 13; SPEP gamma globulin 1.6, no M-spike noted; FIT ordered  4/1/13 hgb 13,hct 39,mcv 75; iron 122,ferritin 1312,%sat 45  1/27/14 hgb 11.8,hct 36,mcv 73,iron 157,ferritin 1267,%sat 55; pt declines referral evaluate liver biopsy  6/2/14 hgb 11.2,hct 33.6,mcv 73,iron 165,%sat 63,ferritin 1275, b12 318, folate 11.7,adj retic 1.1%; SPEP negative  11/18/14 hgb 11.5,hct 36,mcv 75,iron  72,%sat 24,ferritin 1229  4/28/15 hgb 11.2,hct 35,mcv 96,iron 178,%sat 67,ferritin 1316  6/25/15 colon per [facility] anastomotic stricture 15 cm, estimated diameter 30 mm repeat 3 years  10/28/15 hgb 11.7,hct 36,mcv 74,iron 180,%sat 64  1/11/16 hgb 11.4,hct 35,mcv 74,iron 21,%sat 8,ferritin 1414,SPEP negative  6/6/16 hgb 11.3,hct 33.8,mcv 74,iron 132,%sat 45,ferritin 1392,b12 337,folate 12,,retic 1.8,SPEP negative  9/10/16 hgb 10.5,hct 31.6,mcv 75  10/11/16 hgb 10.9,hct 33,mcv 75,iron 135,%sat 43,ferritin 1457  10/14/16 FIT negative  4/17/17 hgb 11.3,hct 35,mcv 74.5,iron 222,ferrritin 1441,,b12 362,folate 11,SPEP negative  10/23/17 hgb 11.6,hct 36,mcv 74.9,iron 187,%sat 63, ferritin 1361 likely hemachromatosis declines liver biopsy or phlebotomy  5/21/18 hgb 12.3,hct 38,mcv 74.5,iron 148,%sat 46,ferritin 1566  9/17/18 hgb 11,hct 34,mcv 76 iron 169,%sat 55,ferritin 1451  1/22/19 hgb 11,hct 35,mcv 77,iron 176,%sat 61,ferritin 1460, AST 21,ALT 14     Adenoma  9/26/07 colon per [facility] negative mixed adenoma polyp  7/2/14 colon per [facility] tubular adenoma x 3 and tubulovillous adenoma, repeat colonoscopy 6 months  7/28/14  surgery procedure note robotic low anterior resection for unresectable lesion by colonoscopy  6/25/15 colon per [facility] anastomotic stricture 15 cm, estimated diameter 30 mm repeat 3 years                    Patient Active Problem List   Diagnosis   • Dyslipidemia   • Anemia   • Preventative health care   • Iron overload   • Diabetes mellitus type 2, diet-controlled (HCC)   • BPH (benign prostatic hypertrophy)   • Adenomatous colon polyp   • Arthritis of knee   • History of tobacco abuse          Health Maintenance Summary                IMM ZOSTER VACCINES Overdue 12/24/2012      Done 10/29/2012 Imm Admin: Zoster Vaccine Live (ZVL) (Zostavax)    URINE ACR / MICROALBUMIN Overdue 4/17/2018      Done 4/17/2017 MICROALBUMIN CREAT RATIO URINE      Patient has more history with this topic...     DIABETES MONOFILAMENT / LE EXAM Overdue 4/17/2018      Done 4/17/2017 AMB DIABETIC MONOFILAMENT LOWER EXTREMITY EXAM     Patient has more history with this topic...    COLONOSCOPY Overdue 6/25/2018      Done 6/25/2015 AMB REFERRAL TO GI FOR COLONOSCOPY     Patient has more history with this topic...    Annual Wellness Visit Overdue 10/24/2018      Done 10/23/2017 Visit Dx: Medicare annual wellness visit, subsequent     Patient has more history with this topic...    RETINAL SCREENING Overdue 4/2/2019      Done 4/2/2018 REFERRAL FOR RETINAL SCREENING EXAM     Patient has more history with this topic...    A1C SCREENING Overdue 7/21/2019      Done 1/21/2019 HEMOGLOBIN A1C      Patient has more history with this topic...    IMM INFLUENZA Next Due 9/1/2019      Done 9/17/2018 Imm Admin: Influenza Vaccine Adult HD     Patient has more history with this topic...    FASTING LIPID PROFILE Next Due 1/21/2020      Done 1/21/2019 LIPID PROFILE     Patient has more history with this topic...    SERUM CREATININE Next Due 1/21/2020      Done 1/21/2019 COMP METABOLIC PANEL      Patient has more history with this topic...    IMM DTaP/Tdap/Td Vaccine Next Due 7/23/2022      Done 7/23/2012 Imm Admin: Tdap Vaccine          Patient Care Team:  Thomas Greenberg M.D. as PCP - General  Mariana Adhikari O.D. as Consulting Physician (Optometry)  River FIGUEROA M.D. (Inactive) as Consulting Physician (Gastroenterology)  Massimo Zamudio M.D. as Consulting Physician (Surgery)      ROS       Objective:          Physical Exam   Constitutional: He appears well-developed and well-nourished. No distress.   HENT:   Head: Normocephalic and atraumatic.   Neck: No JVD present.   Cardiovascular: Normal rate and regular rhythm.    Pulmonary/Chest: Effort normal and breath sounds normal.   Abdominal: He exhibits no distension.   Neurological: He is alert.   Skin: Skin is warm. He is not diaphoretic.   Psychiatric: He has a normal mood and affect. His behavior  is normal.   Nursing note and vitals reviewed.    Left knee healing superficial laceration and left elbow, no evidence of secondary infection          Monofilament testing with a 10 gram force: sensation intact:  intact  Visual Inspection: Feet without maceration, ulcers, fissures.  Pedal pulses: intact    Assessment/Plan:     Assessment  #1 Diabetes type II 1/22/19 A1c 7.1% on metformin 500 mg bid not checking blood sugars    #2 Iron overload 1/22/19 AST 21,ALT 14,hgb 11,hct 35,iron 176,%sat 61,ferritin 1460 has declined phlebotomy and liver biopsy     #3 Dyslipidemia 1/22/19 chol 117,trig 63,hdl 55,ldl 49 on pravachol 10 mg      #4 Anemia 1/22/19 hgb 11,hct 35,mcv 77,iron 176,%sat 61,ferritin 1460, AST 21,ALT 14     #5 adenoma 6/25/15 colon per [facility] anastomotic stricture 15 cm, estimated diameter 30 mm repeat 3 years, due for follow-up     #6 s/p fall at home 3 days ago accidentally tripped outside in the yard, no history of recurrent falls       Plan  #! Eye exam at [facility] eye referral     #2 [facility] colon cancer screening    #3 declines liver evaluation and possible biopsy for iron overload    #4 declines phlebotomy for iron overload    #5 labs have been ordered in the computer      #6 declines physical therapy, fall precautions emphasized    #7 continue medications, patient declines to check fingerstick blood sugars at home    #8 follow-up 6 months    #9 continue work on eliminating alcohol, eliminating sodas and sweets

## 2019-07-23 ENCOUNTER — TELEPHONE (OUTPATIENT)
Dept: MEDICAL GROUP | Facility: MEDICAL CENTER | Age: 79
End: 2019-07-23

## 2019-07-24 LAB
ALBUMIN SERPL ELPH-MCNC: 3.99 G/DL (ref 3.75–5.01)
ALPHA1 GLOB SERPL ELPH-MCNC: 0.28 G/DL (ref 0.19–0.46)
ALPHA2 GLOB SERPL ELPH-MCNC: 0.69 G/DL (ref 0.48–1.05)
B-GLOBULIN SERPL ELPH-MCNC: 0.78 G/DL (ref 0.48–1.1)
GAMMA GLOB SERPL ELPH-MCNC: 1.36 G/DL (ref 0.62–1.51)
INTERPRETATION SERPL IFE-IMP: NORMAL
MONOCLON BAND OBS SERPL: NORMAL
PATHOLOGY STUDY: NORMAL
PROT SERPL-MCNC: 7.1 G/DL (ref 6–8.3)

## 2019-07-25 ENCOUNTER — TELEPHONE (OUTPATIENT)
Dept: MEDICAL GROUP | Facility: MEDICAL CENTER | Age: 79
End: 2019-07-25

## 2019-07-25 DIAGNOSIS — D64.9 ANEMIA, UNSPECIFIED TYPE: ICD-10-CM

## 2019-07-28 ENCOUNTER — TELEPHONE (OUTPATIENT)
Dept: MEDICAL GROUP | Facility: MEDICAL CENTER | Age: 79
End: 2019-07-28

## 2019-07-29 ENCOUNTER — TELEPHONE (OUTPATIENT)
Dept: MEDICAL GROUP | Facility: MEDICAL CENTER | Age: 79
End: 2019-07-29

## 2019-07-29 NOTE — TELEPHONE ENCOUNTER
----- Message from Thomas Greenberg M.D. sent at 7/28/2019 11:01 PM PDT -----  Called patient's wife and left message again, please notify her that the 's blood test shows:  (1) diabetes test is stable at 7.2%, continue the metformin diabetes medication  (2) the liver function, kidney function test are normal  (3) total cholesterol is excellent at 134  (4) prostate cancer number is normal  (5) his iron levels are still high and his blood counts are still low, I would recommend seeing a blood specialist for further evaluation.  I would like to place that referral, please let me know if she agrees.

## 2019-07-29 NOTE — LETTER
August 15, 2019        Jane Valentin  [address]  [place] [address]        Dear Jane:    Please be advised that our office has tried to reach you by phone several times, without success.    Please note that Dr. Greenberg had this message for you:      ----- Message from Thomas Greenberg M.D. sent at 7/28/2019 11:01 PM PDT -----  Called patient's wife and left message again, please notify her that the 's blood test shows:  (1) diabetes test is stable at 7.2%, continue the metformin diabetes medication  (2) the liver function, kidney function test are normal  (3) total cholesterol is excellent at 134  (4) prostate cancer number is normal  (5) his iron levels are still high and his blood counts are still low, I would recommend seeing a blood specialist for further evaluation.  I would like to place that referral, please let me know if she agrees.       If you have any questions or concerns, please don't hesitate to call.        Sincerely,        Miladys Trevizo, Med Ass't      Electronically Signed

## 2019-07-29 NOTE — TELEPHONE ENCOUNTER
Called patient's wife and left message again, please notify her that the 's blood test shows:  (1) diabetes test is stable at 7.2%, continue the metformin diabetes medication  (2) the liver function, kidney function test are normal  (3) total cholesterol is excellent at 134  (4) prostate cancer number is normal  (5) his iron levels are still high and his blood counts are still low, I would recommend seeing a blood specialist for further evaluation.  I would like to place that referral, please let me know if she agrees.

## 2019-08-20 ENCOUNTER — TELEPHONE (OUTPATIENT)
Dept: HEMATOLOGY ONCOLOGY | Facility: MEDICAL CENTER | Age: 79
End: 2019-08-20

## 2019-08-20 NOTE — TELEPHONE ENCOUNTER
1st attempt to contact the patient.  LM on voicemail for patient requesting a call back to schedule a new patient hematology appointment.  NP/SCP/Anemia/Thomas Greenberg

## 2019-09-10 NOTE — PROGRESS NOTES
"09/12/19    Subjective    Chief Complaint:  77 yo male referred by Dr. Greenberg for evaluation of anemia    HPI:  Born in [place] - came [place] 1962. Has been anemic as long as he can remember. RBC's are microcytic on every lab test dating back to 2009.  Had a colon polyp resected 5 years ago but no h/o GI bleeding. ROS:    Constitutional: No weight loss  Skin: No rash or jaundice  HENT: s/p cataract surgeries  Cardiovascular: No chest pain  Respiratory: No cough, SOB  GI: No c/o's  : No c/o's  Musculoskeletal: No bone pains or joint c/o's      PMH:    Allergies   Allergen Reactions   • Nkda [No Known Drug Allergy]        Medications:    Current Outpatient Medications on File Prior to Visit   Medication Sig Dispense Refill   • metFORMIN (GLUCOPHAGE) 500 MG Tab Take 1 Tab by mouth 2 times a day, with meals. 200 Tab 1   • pravastatin (PRAVACHOL) 10 MG Tab Take 1 Tab by mouth every day. 100 Tab 1   • aspirin (ASA) 81 MG Chew Tab chewable tablet Take 1 Tab by mouth every day. (Patient not taking: Reported on 9/12/2019) 100 Tab 11     No current facility-administered medications on file prior to visit.      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 No distress.    Head: Normocephalic .   Mouth/Throat: Oropharynx is clear and moist. No oropharyngeal exudate.   Eyes: Pupils are equal, round, and reactive to light. Conjunctivae and EOM are normal. No scleral icterus.   Neck: Normal range of motion. Neck supple. No thyromegaly  Cardiovascular: Normal rate, regular rhythm, normal heart sounds and intact distal " pulses. No  gallop, murmur or rub  Pulmonary/ Normal breath sounds.  No wheeze, rales or rhonci  Breast - symmetrical. No mass on indentation.  Abdominal: Soft. Bowel sounds are normal. No distension, tenderness, organomegaly or mass  Musculoskeletal: Normal range of motion. .   Lymphadenopathy:   Head: No submental, submandibular, preauricular,   No cervical, axillary or inguinal adenopathy.    Neurological-  Alert and oriented to person, place, and time.   Skin: Skin is warm and dry. No rash noted. Not diaphoretic. No erythema. No pallor. Or jaundice.   Psychiatric- Normal mood and affect.      Labs:    Results for JENNIFER MACIAS (MRN [id number])    Ref. Range 7/22/2019 11:30   WBC Latest Ref Range: 4.8 - 10.8 K/uL 6.2   RBC Latest Ref Range: 4.70 - 6.10 M/uL 4.33 (L)   Hemoglobin Latest Ref Range: 14.0 - 18.0 g/dL 10.1 (L)   Hematocrit Latest Ref Range: 42.0 - 52.0 % 32.4 (L)   MCV Latest Ref Range: 81.4 - 97.8 fL 74.8 (L)   MCH Latest Ref Range: 27.0 - 33.0 pg 23.3 (L)   MCHC Latest Ref Range: 33.7 - 35.3 g/dL 31.2 (L)   RDW Latest Ref Range: 35.9 - 50.0 fL 40.2   Platelet Count Latest Ref Range: 164 - 446 K/uL 275      Ref. Range 7/22/2019 11:30   Iron Latest Ref Range: 50 - 180 ug/dL 151   Total Iron Binding Latest Ref Range: 250 - 450 ug/dL 252   % Saturation Latest Ref Range: 15 - 55 % 60 (H)      Ref. Range 7/22/2019 11:30   Ferritin Latest Ref Range: 22.0 - 322.0 ng/mL 1443.9 (H)      Ref. Range 7/22/2019 11:30   Sodium Latest Ref Range: 135 - 145 mmol/L 139   Potassium Latest Ref Range: 3.6 - 5.5 mmol/L 4.5   Chloride Latest Ref Range: 96 - 112 mmol/L 104   Co2 Latest Ref Range: 20 - 33 mmol/L 27   Anion Gap Latest Ref Range: 0.0 - 11.9  8.0   Glucose Latest Ref Range: 65 - 99 mg/dL 154 (H)   Bun Latest Ref Range: 8 - 22 mg/dL 19   Creatinine Latest Ref Range: 0.50 - 1.40 mg/dL 0.97   GFR If  Latest Ref Range: >60 mL/min/1.73 m 2 >60   GFR If Non  Latest Ref Range: >60  mL/min/1.73 m 2 >60   Calcium Latest Ref Range: 8.5 - 10.5 mg/dL 9.0   AST(SGOT) Latest Ref Range: 12 - 45 U/L 19   ALT(SGPT) Latest Ref Range: 2 - 50 U/L 14   Alkaline Phosphatase Latest Ref Range: 30 - 99 U/L 78   Total Bilirubin Latest Ref Range: 0.1 - 1.5 mg/dL 1.0   Albumin Latest Ref Range: 3.2 - 4.9 g/dL 4.0   Total Protein Latest Ref Range: 6.0 - 8.2 g/dL 7.1   Globulin Latest Ref Range: 1.9 - 3.5 g/dL 3.1   A-G Ratio Latest Units: g/dL 1.3   LDH Total Latest Ref Range: 107 - 266 U/L 196     Assessment    Imp:    Visit Diagnosis:    1. Iron overload     2. 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    Plan:    D/C ETOH due to increased ferritin  Hemoglobin electrophoresis - call for results    Santiago Sweeney M.D.

## 2019-09-11 ENCOUNTER — TELEPHONE (OUTPATIENT)
Dept: HEMATOLOGY ONCOLOGY | Facility: MEDICAL CENTER | Age: 79
End: 2019-09-11

## 2019-09-12 ENCOUNTER — HOSPITAL ENCOUNTER (OUTPATIENT)
Dept: LAB | Facility: MEDICAL CENTER | Age: 79
End: 2019-09-12
Attending: INTERNAL MEDICINE
Payer: MEDICARE

## 2019-09-12 ENCOUNTER — OFFICE VISIT (OUTPATIENT)
Dept: HEMATOLOGY ONCOLOGY | Facility: MEDICAL CENTER | Age: 79
End: 2019-09-12
Payer: MEDICARE

## 2019-09-12 VITALS
HEIGHT: 64 IN | WEIGHT: 139 LBS | DIASTOLIC BLOOD PRESSURE: 60 MMHG | BODY MASS INDEX: 23.73 KG/M2 | OXYGEN SATURATION: 97 % | HEART RATE: 86 BPM | SYSTOLIC BLOOD PRESSURE: 118 MMHG | TEMPERATURE: 98.7 F | RESPIRATION RATE: 16 BRPM

## 2019-09-12 DIAGNOSIS — D50.9 MICROCYTIC ANEMIA: ICD-10-CM

## 2019-09-12 DIAGNOSIS — D58.2 HEMOGLOBINOPATHY (HCC): ICD-10-CM

## 2019-09-12 DIAGNOSIS — E83.19 IRON OVERLOAD: Chronic | ICD-10-CM

## 2019-09-12 PROCEDURE — 83021 HEMOGLOBIN CHROMOTOGRAPHY: CPT

## 2019-09-12 PROCEDURE — 36415 COLL VENOUS BLD VENIPUNCTURE: CPT

## 2019-09-12 PROCEDURE — 99203 OFFICE O/P NEW LOW 30 MIN: CPT | Performed by: INTERNAL MEDICINE

## 2019-09-12 ASSESSMENT — PAIN SCALES - GENERAL: PAINLEVEL: NO PAIN

## 2019-09-13 ENCOUNTER — TELEPHONE (OUTPATIENT)
Dept: HEMATOLOGY ONCOLOGY | Facility: MEDICAL CENTER | Age: 79
End: 2019-09-13

## 2019-09-13 NOTE — TELEPHONE ENCOUNTER
Jaelyn called to check on the status of Jane's labs from yesterday. I told her that they were still being processed and that we would give them a call with the results.

## 2019-09-13 NOTE — TELEPHONE ENCOUNTER
Left message with contact information returning call from spouse.    For staff: If unable to reach RN, please ask Jaelyn where and when pt had labs done as RN has not been able to confirm that they are being processed at [facility].

## 2019-09-15 LAB
HGB A1 MFR BLD: 91.8 % (ref 95–97.9)
HGB A2 MFR BLD: 5.2 % (ref 2–3.5)
HGB C MFR BLD: 0 % (ref 0–0)
HGB E MFR BLD: 0 % (ref 0–0)
HGB F MFR BLD: 3 % (ref 0–2.1)
HGB FRACT BLD ELPH-IMP: ABNORMAL
HGB OTHER MFR BLD: 0 % (ref 0–0)
HGB S BLD QL SOLY: ABNORMAL
HGB S MFR BLD: 0 % (ref 0–0)
PATH INTERP BLD-IMP: ABNORMAL

## 2019-09-16 ENCOUNTER — TELEPHONE (OUTPATIENT)
Dept: HEMATOLOGY ONCOLOGY | Facility: MEDICAL CENTER | Age: 79
End: 2019-09-16

## 2019-09-16 NOTE — TELEPHONE ENCOUNTER
Jaelyn returned call from RN.  RN explained that results were not available until today, and Dr. Sweeney is not available until tomorrow.  Jaelyn is agreeable to hear back tomorrow after Dr. Sweeney has been able to review.

## 2019-09-16 NOTE — TELEPHONE ENCOUNTER
Patients wife Jaelyn returned KIRAN Clark call. I let her know Amber is unavailable at the moment and I would ask her to call back in regards to patients Hemoglobinopathy done on 9/12/19.    Jaelyn verbalized understanding and will await a returned call.

## 2019-09-17 ENCOUNTER — TELEPHONE (OUTPATIENT)
Dept: HEMATOLOGY ONCOLOGY | Facility: MEDICAL CENTER | Age: 79
End: 2019-09-17

## 2019-09-17 NOTE — TELEPHONE ENCOUNTER
Received call from Jaelyn, wife, and reported per Dr. 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 There is no treatment but he should avoid taking alcohol and iron as his iron is abnormally elevated. No follow up necessary and no Rx.  Wife verbalized understanding and asked if PCP had received a copy.  Informed her that he was not specifically sent copy, but did have access to results.  Wife indicated no additional questions.

## 2019-12-02 ENCOUNTER — HOSPITAL ENCOUNTER (OUTPATIENT)
Dept: LAB | Facility: MEDICAL CENTER | Age: 79
End: 2019-12-02
Attending: INTERNAL MEDICINE
Payer: MEDICARE

## 2019-12-02 ENCOUNTER — OFFICE VISIT (OUTPATIENT)
Dept: MEDICAL GROUP | Facility: MEDICAL CENTER | Age: 79
End: 2019-12-02
Payer: MEDICARE

## 2019-12-02 VITALS
HEART RATE: 76 BPM | DIASTOLIC BLOOD PRESSURE: 62 MMHG | HEIGHT: 65 IN | BODY MASS INDEX: 23.49 KG/M2 | SYSTOLIC BLOOD PRESSURE: 110 MMHG | WEIGHT: 141 LBS | OXYGEN SATURATION: 97 % | TEMPERATURE: 97.5 F

## 2019-12-02 DIAGNOSIS — E11.9 DIABETES MELLITUS TYPE 2, DIET-CONTROLLED (HCC): Chronic | ICD-10-CM

## 2019-12-02 DIAGNOSIS — E78.5 DYSLIPIDEMIA: Chronic | ICD-10-CM

## 2019-12-02 DIAGNOSIS — E83.19 IRON EXCESS: ICD-10-CM

## 2019-12-02 DIAGNOSIS — D58.2 HEMOGLOBINOPATHY (HCC): ICD-10-CM

## 2019-12-02 DIAGNOSIS — E11.9 DIABETES MELLITUS WITHOUT COMPLICATION (HCC): ICD-10-CM

## 2019-12-02 DIAGNOSIS — D56.3 BETA THALASSEMIA TRAIT: ICD-10-CM

## 2019-12-02 DIAGNOSIS — Z23 NEEDS FLU SHOT: ICD-10-CM

## 2019-12-02 LAB
ALBUMIN SERPL BCP-MCNC: 4.4 G/DL (ref 3.2–4.9)
ALBUMIN/GLOB SERPL: 1.4 G/DL
ALP SERPL-CCNC: 62 U/L (ref 30–99)
ALT SERPL-CCNC: 17 U/L (ref 2–50)
ANION GAP SERPL CALC-SCNC: 8 MMOL/L (ref 0–11.9)
APPEARANCE UR: CLEAR
AST SERPL-CCNC: 18 U/L (ref 12–45)
BACTERIA #/AREA URNS HPF: NEGATIVE /HPF
BASOPHILS # BLD AUTO: 0.5 % (ref 0–1.8)
BASOPHILS # BLD: 0.03 K/UL (ref 0–0.12)
BILIRUB SERPL-MCNC: 1.3 MG/DL (ref 0.1–1.5)
BILIRUB UR QL STRIP.AUTO: NEGATIVE
BUN SERPL-MCNC: 16 MG/DL (ref 8–22)
CALCIUM SERPL-MCNC: 9.4 MG/DL (ref 8.5–10.5)
CHLORIDE SERPL-SCNC: 105 MMOL/L (ref 96–112)
CHOLEST SERPL-MCNC: 150 MG/DL (ref 100–199)
CO2 SERPL-SCNC: 27 MMOL/L (ref 20–33)
COLOR UR: YELLOW
CREAT SERPL-MCNC: 0.99 MG/DL (ref 0.5–1.4)
CREAT UR-MCNC: 100.7 MG/DL
EOSINOPHIL # BLD AUTO: 0.2 K/UL (ref 0–0.51)
EOSINOPHIL NFR BLD: 3.6 % (ref 0–6.9)
EPI CELLS #/AREA URNS HPF: NEGATIVE /HPF
ERYTHROCYTE [DISTWIDTH] IN BLOOD BY AUTOMATED COUNT: 40.4 FL (ref 35.9–50)
FASTING STATUS PATIENT QL REPORTED: NORMAL
GLOBULIN SER CALC-MCNC: 3.2 G/DL (ref 1.9–3.5)
GLUCOSE SERPL-MCNC: 159 MG/DL (ref 65–99)
GLUCOSE UR STRIP.AUTO-MCNC: NEGATIVE MG/DL
HCT VFR BLD AUTO: 35.7 % (ref 42–52)
HDLC SERPL-MCNC: 50 MG/DL
HGB BLD-MCNC: 11.4 G/DL (ref 14–18)
HYALINE CASTS #/AREA URNS LPF: ABNORMAL /LPF
IMM GRANULOCYTES # BLD AUTO: 0.01 K/UL (ref 0–0.11)
IMM GRANULOCYTES NFR BLD AUTO: 0.2 % (ref 0–0.9)
IRON SATN MFR SERPL: 45 % (ref 15–55)
IRON SERPL-MCNC: 134 UG/DL (ref 50–180)
KETONES UR STRIP.AUTO-MCNC: ABNORMAL MG/DL
LDLC SERPL CALC-MCNC: 78 MG/DL
LEUKOCYTE ESTERASE UR QL STRIP.AUTO: NEGATIVE
LYMPHOCYTES # BLD AUTO: 1.75 K/UL (ref 1–4.8)
LYMPHOCYTES NFR BLD: 31.9 % (ref 22–41)
MCH RBC QN AUTO: 24.5 PG (ref 27–33)
MCHC RBC AUTO-ENTMCNC: 31.9 G/DL (ref 33.7–35.3)
MCV RBC AUTO: 76.6 FL (ref 81.4–97.8)
MICRO URNS: ABNORMAL
MICROALBUMIN UR-MCNC: 10.1 MG/DL
MICROALBUMIN/CREAT UR: 100 MG/G (ref 0–30)
MONOCYTES # BLD AUTO: 0.36 K/UL (ref 0–0.85)
MONOCYTES NFR BLD AUTO: 6.6 % (ref 0–13.4)
NEUTROPHILS # BLD AUTO: 3.13 K/UL (ref 1.82–7.42)
NEUTROPHILS NFR BLD: 57.2 % (ref 44–72)
NITRITE UR QL STRIP.AUTO: NEGATIVE
NRBC # BLD AUTO: 0 K/UL
NRBC BLD-RTO: 0 /100 WBC
PH UR STRIP.AUTO: 6 [PH] (ref 5–8)
PLATELET # BLD AUTO: 220 K/UL (ref 164–446)
PMV BLD AUTO: 11.4 FL (ref 9–12.9)
POTASSIUM SERPL-SCNC: 4.8 MMOL/L (ref 3.6–5.5)
PROT SERPL-MCNC: 7.6 G/DL (ref 6–8.2)
PROT UR QL STRIP: 30 MG/DL
RBC # BLD AUTO: 4.66 M/UL (ref 4.7–6.1)
RBC # URNS HPF: ABNORMAL /HPF
RBC UR QL AUTO: NEGATIVE
SODIUM SERPL-SCNC: 140 MMOL/L (ref 135–145)
SP GR UR STRIP.AUTO: 1.02
TIBC SERPL-MCNC: 295 UG/DL (ref 250–450)
TRIGL SERPL-MCNC: 110 MG/DL (ref 0–149)
TSH SERPL DL<=0.005 MIU/L-ACNC: 0.45 UIU/ML (ref 0.38–5.33)
UROBILINOGEN UR STRIP.AUTO-MCNC: 1 MG/DL
WBC # BLD AUTO: 5.5 K/UL (ref 4.8–10.8)
WBC #/AREA URNS HPF: ABNORMAL /HPF

## 2019-12-02 PROCEDURE — 99214 OFFICE O/P EST MOD 30 MIN: CPT | Mod: 25 | Performed by: INTERNAL MEDICINE

## 2019-12-02 PROCEDURE — 81001 URINALYSIS AUTO W/SCOPE: CPT

## 2019-12-02 PROCEDURE — 90662 IIV NO PRSV INCREASED AG IM: CPT | Performed by: INTERNAL MEDICINE

## 2019-12-02 PROCEDURE — 83550 IRON BINDING TEST: CPT

## 2019-12-02 PROCEDURE — 83036 HEMOGLOBIN GLYCOSYLATED A1C: CPT

## 2019-12-02 PROCEDURE — 36415 COLL VENOUS BLD VENIPUNCTURE: CPT

## 2019-12-02 PROCEDURE — 80053 COMPREHEN METABOLIC PANEL: CPT

## 2019-12-02 PROCEDURE — 82570 ASSAY OF URINE CREATININE: CPT

## 2019-12-02 PROCEDURE — 82043 UR ALBUMIN QUANTITATIVE: CPT

## 2019-12-02 PROCEDURE — 85025 COMPLETE CBC W/AUTO DIFF WBC: CPT

## 2019-12-02 PROCEDURE — 84443 ASSAY THYROID STIM HORMONE: CPT

## 2019-12-02 PROCEDURE — 80061 LIPID PANEL: CPT

## 2019-12-02 PROCEDURE — G0008 ADMIN INFLUENZA VIRUS VAC: HCPCS | Performed by: INTERNAL MEDICINE

## 2019-12-02 PROCEDURE — 83540 ASSAY OF IRON: CPT

## 2019-12-02 NOTE — PROGRESS NOTES
Subjective:      Jane Valentin is a 79 y.o. male who presents follow up diabetes        HPI      Here with wife for follow-up diabetes, on metformin 500 mg twice daily, patient declines to check his blood sugars at home.  No retinopathy, neuropathy, nephropathy.  Remains on pravastatin 10 mg daily with no muscle pain.  Limit sweets and candies, occasional soda but not on a regular basis, 1 beer per day.  No history of hypertension, no ACE inhibitor.  No cuts or sores on feet or lower extremities,  Patient was seen by hematology oncology for chronic anemia, elevated ferritin, hemoglobin electrophoresis did show evidence of beta thalassemia trait, oncology recommended no alcohol.  Patient has retired from cooking.  Spends time with his wife.  Tries to keep active.  No joint aches or joint pains.  No chest pain, shortness of breath, cough.  No tobacco        Current Outpatient Medications   Medication Sig Dispense Refill   • metFORMIN (GLUCOPHAGE) 500 MG Tab Take 1 Tab by mouth 2 times a day, with meals. 200 Tab 1   • pravastatin (PRAVACHOL) 10 MG Tab Take 1 Tab by mouth every day. 100 Tab 1   • aspirin (ASA) 81 MG Chew Tab chewable tablet Take 1 Tab by mouth every day. (Patient not taking: Reported on 9/12/2019) 100 Tab 11     No current facility-administered medications for this visit.             Preventative health  7/23/12 tdap  10/29/12 zoster vaccine  4/1/13 pneumovax vaccine  7/28/14  surgery procedure note robotic low anterior resection for unresectable lesion by colonoscopy  10/28/15 prevnar at [facility]  1/21/19 hep b third in series   7/22/19 psa 2.6  10/8/19 colonoscopy per [facility] evidence of previous end-to-end end-to-side colocolonic anastomosis distal sigmoid colon, stage I hemorrhoids, repeat 5 years    microcytic anemia  11/08 hgb 12.2,hct 35.6, mcv 73  6/09  Hgb 12.7, hct 39.2, mcv 77, normal B12 and folate, iron overload see note  7/10 hgb 12,hct 37  11/10 hgb 12.4,hct 37.5  9/11 hgb 12.6,hct  37.6,mcv 75  4/12 hgb 12,hct 37.5  7/12 hgb 11.9,hct 37,mcv 75  10/29/12 hgb 12.3,hct 36.2,mcv 73;iron 146,ferritin 1367,%sat 52,ESR 18, B12 383,folate 13; SPEP gamma globulin 1.6, no M-spike noted; FIT ordered  4/1/13 hgb 13,hct 39,mcv 75; iron 122,ferritin 1312,%sat 45  1/27/14 hgb 11.8,hct 36,mcv 73,iron 157,ferritin 1267,%sat 55; pt declines referral evaluate liver biopsy  6/2/14 hgb 11.2,hct 33.6,mcv 73,iron 165,%sat 63,ferritin 1275, b12 318, folate 11.7,adj retic 1.1%; SPEP negative  11/18/14 hgb 11.5,hct 36,mcv 75,iron 72,%sat 24,ferritin 1229  4/28/15 hgb 11.2,hct 35,mcv 96,iron 178,%sat 67,ferritin 1316  6/25/15 colon per [facility] anastomotic stricture 15 cm, estimated diameter 30 mm repeat 3 years  10/28/15 hgb 11.7,hct 36,mcv 74,iron 180,%sat 64  1/11/16 hgb 11.4,hct 35,mcv 74,iron 21,%sat 8,ferritin 1414,SPEP negative  6/6/16 hgb 11.3,hct 33.8,mcv 74,iron 132,%sat 45,ferritin 1392,b12 337,folate 12,,retic 1.8,SPEP negative  9/10/16 hgb 10.5,hct 31.6,mcv 75  10/11/16 hgb 10.9,hct 33,mcv 75,iron 135,%sat 43,ferritin 1457  10/14/16 FIT negative  4/17/17 hgb 11.3,hct 35,mcv 74.5,iron 222,ferrritin 1441,,b12 362,folate 11,SPEP negative  10/23/17 hgb 11.6,hct 36,mcv 74.9,iron 187,%sat 63, ferritin 1361 likely hemachromatosis declines liver biopsy or phlebotomy  5/21/18 hgb 12.3,hct 38,mcv 74.5,iron 148,%sat 46,ferritin 1566  9/17/18 hgb 11,hct 34,mcv 76 iron 169,%sat 55,ferritin 1451  1/22/19 hgb 11,hct 35,mcv 77,iron 176,%sat 61,ferritin 1460, AST 21,ALT 14  7/22/19 hgb 10,hct 32,mcv 74,iron 151,%sat 60,ferritin 1443,,retic 1.4,b12 486,folate 85  9/12/19  hematology note iron overload, microcytic anemia, 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, discontinue alcohol, hemoglobin electrophoresis  9/12/19 hemoglobin electrophoresis, hemoglobin A 191.8% (95.0 to 97.9%), hemoglobin 8 to 5.2% (2.0 to 3.5%), hemoglobin of 3.0% (0.0 to 2.1%, impression elevated hemoglobin A2 is slightly high  hemoglobin F, elevated hemoglobin A2 can be seen in beta thalassemia trait, and hemoglobin F can be seen and acquired conditions  10/8/19 colonoscopy per [facility] evidence of previous end-to-end end-to-side colocolonic anastomosis distal sigmoid colon, stage I hemorrhoids, repeat 5 years     Iron overload  6/09 iron 197,saturation 71%,ferritin 1556,normal hemoglobin hematocrit, normal AST, ALT. Patient is adopted no family history of hemochromatosis. We'll repeat labs plus hemochromatosis genetic testing one month, at this point I am considering recommending phlebotomy once every 2-3 months however his hematocrit is already at 39 which I think is reasonable given the fact that he has no other manifestations of disease such as hepatitis, diabetes, cardiomyopathy.  8/09 iron 202,%sat 86,ferritin 1805, AST 21,ALT 21  9/09 HH negative for C282Y,H63D,S65C  9/09 called patient recommend consider liver biopsy for definitive diagnosis hemochromatosis given negative genetic testing, we will discuss with him, other option would be to proceed with phlebotomy to get hemoglobin less than 12, although without a definitive diagnosis for iron stores liver or genetic testing for hemochromatosis this would not be an ideal treatment situation.  11/16/09 set up for phlebotomy had done 1x, refuses more, refuses liver bx  7/10 iron 231,ferritin 2198,%sat 95,TIBC 242; AST 25,ALT 30; hgb 12,hct 37  11/10 iron 178,ferritin 1997,%sat 74,AST 22,ALT 22,hgb 12.4,hct 37.5,  6/11 iron 180,ferritin 1479,%sat 65,AST 23,ALT 24,hgb 12.4,hct 39,  9/11 iron 113,ferritin 1423,%sat 42,AST 21,ALT 25,hgb 12.6,hct 37.6,; pt cont to decline liver bx, phlebotomy  4/12 iron 192,ferritin 1355,%sat 68,AST 25,ALT 30,hgb 12,hct 37.5; decline phlebotomy  7/12 iron 121,%sat 46,AST 22,ALT 21  10/12 iron 146,ferritin 1367,%sat 52,AST 25,ALT 26; declines phlebotomy  4/1/13 AST 20,ALT 23, ferritin 1312, %sat 45 iron 122, hgb 13,hct 39, mcv 75  1/27/14 AST  21,ALT 21, ferritin 1267, %sat 55, iron 157, hgb 11.8, hct 36, mcv 73  6/2/14 AST 20,ALT 20, ferritin 1275, %sat   Iron hgb 11.2, hct 33.6,mcv 73  11/18/14 AST 17,ALT 17,hgb 11.5,hct 36,mcv 75,iron 72,%sat 24,ferritin 1229  4/28/15 AST 21,ALT 20,hgb 11.2,hct 35,mcv 76,iron 178,%sat 67,ferritin 1316  10/28/15 iron 180,%sat 64,ferritin cancelled,AST 20,ALT 21,hemochromatosis C282Y negative, H63D negative, S56C negative  1/11/16 iron  6/6/16 hgb 11.3,hct 33.8,mcv 74,iron 132,%sat 45,ferritin 139,AST 21,ALT 18 21,%sat 8,ferritin 1414,hgb 11.4,hct 35,mcv 74,AST 19,ALT 19  9/10/16 AST 22,ALT 20,hgb 10.5,hct 31.6,mcv 75  10/11/16 AST 21,ALT 20,alk phos 66,bili 1.8  4/17/17 AST 18,ALT 17,iron 222,ferritin 1441,,hgb 11.3,hct 35,mcv 74  10/23/17 AST 20,ALT 17,iron 187,%sat 63, ferritin 1361, hgb 11.6,hct 36, A1c 7.2%  2/27/18 AST 17,ALT 18,iron 70,%sat 24,ferritin 1200,hgb 11.3,hct 34.5  5/21/18 AST 18,ALT 18,iron 148,%sat 46,ferritin 1566,hgb 12.3,hct 38  9/17/18 AST 20,ALT 16,hgb 11,hct 34,iron 169,ferritin 55%,ferritin 1451  1/22/19 AST 21,ALT 14,hgb 11,hct 35,iron 176,%sat 61,ferritin 1460  7/22/19 hgb 10,creat 32,mcv 74,iron 151,%sat 60,ferritin 1443,,retic 1.4,b12 486,folate 85  9/12/19  hematology note iron overload, microcytic anemia, 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, discontinue alcohol, hemoglobin electrophoresis, need to avoid etoh and iron supplementation    Hemoglobinopathy  9/12/19 hemoglobin electrophoresis, hemoglobin A 191.8% (95.0 to 97.9%), hemoglobin 8 to 5.2% (2.0 to 3.5%), hemoglobin of 3.0% (0.0 to 2.1%, impression elevated hemoglobin A2 is slightly high hemoglobin F, elevated hemoglobin A2 can be seen in beta thalassemia trait, and hemoglobin F can be seen and acquired conditions     Dyslipidemia  4/08 chol 183,trig 114,hdl 66,ldl 94  7/10 chol 167,trig 99,hdl 50,ldl 97  10/12 chol 183,trig 67,hdl 51,ldl 119  1/27/14 chol 170,trig 53,hdl 55,ldl 104  4/28/15 chol 159,trig  77,hdl 52,ldl 92  10/28/15 chol 185,trig 68,hdl 59,ldl 112  1/11/16 chol 172,trig 62,hdl 61,ldl 99 start pravachol 10 mg (diabetes)  6/6/16 chol 158,trig 63,hdl 58,ldl 87 on pravachol 10 mg  4/17/17 change to pravachol 20 mg 1/2 per day  4/17/17 chol 134,trig 61,hdl 60,ldl 62 on pravachol 20 mg take 1/2 per day  10/23/17 chol 130,trig 57,hdl 59,ldl 60 on pravachol 20 mg take 1/2 per day  5/21/18 on pravachol 10 mg daily  9/17/18 chol 133,trig 65,hdl 56,ldl 64 on pravachol 10 mg  1/22/19 chol 117,trig 63,hdl 55,ldl 49 on pravachol 10 mg  7/22/19 chol 134,trig 134,hdl 38,ldl 69 on pravachol 10 mg     Diabetes  8/09 blood sugar 126 likely related to hemochromatosis  7/10 bs 117,A1c 6.6%  11/10 bs 111  9/11 bs 125  4/12 A1c 6.5%,bs 142  7/12 A1c 6.3%,bs 122  10/12 A1c 6.9%,bs 132 pt declines fingerstick bs testing  4/1/13 A1c 6.6%; bs 137  pt declines fingerstick bs testing  1/27/14 A1c 6.7%, urine mac 30; declines to check his own blood sugar, declines diabetes education  6/2/14 A1c 6.3% no meds, declines to check blood sugar  11/18/14 A1c 6.9% no meds, declines to check blood sugars  11/19/14 start metformin 500 mg qday, patient declines checking blood sugars, repeat labs three months  11/19/14  eye exam cataract  2/26/15 not taking metformin, recommend take 500 mg daily  4/28/15 A1c 7.1%, bs 171,urine mac 18, not taking metformin  6/1/15 declines diabetes classes, ACE, statin, asa    6/29/15 resume metformin 500 mg bid  10/19/15 diabetes education classes referral  10/28/15 A1c 6.8%, bs 141 on metformin 500 mg once per day, not checking sugars, diabetes classes pending  1/11/16 A1c 6.9% on metformin 500 mg qday, declines ACE, start pravachol 10 mg  6/6/16 A1c 6.6% on metformin 500 mg qday  10/11/16 A1c 7% on metformin 500 mg qday declines ACE, I recommend asa 81 mg  12/5/16  eye exam bilateral cataract, vitreous degeneration  4/17/17 A1c 7.2% on metformin 500 mg qday declines diabetic classes,  education, check blood sugars, and ACE inhibitor; remains on metformin 500 mg daily  10/23/17 A1c 7.2% on metformin 500 mg qday, not checking blood sugar regularly, has iron overload hemochromatosis likely, but declines liver biopsy or phlebotomy  2/27/18 A1c 7.1% on metformin 500 mg  4/2/18  eye exam, no evidence of diabetic retinopathy  5/22/18 A1c 7.6% on metformin 500 mg qday increase to bid   9/17/18 A1c 7.3% on metformin 500 mg qday  1/22/19 A1c 7.1% on metformin 500 mg bid  7/22/19 A1c 7.2% on metformin 500 mg bid  8/26/19  eye exam     BPH  6/20/11 trial of flomax  9/11 psa 1.2  10/12 flomax resume and start proscar  10/12 psa 1.4  4/1/13 off flomax and proscar     Arthritis left knee     Anemia  11/08 hgb 12.2,hct 35.6, mcv 73  6/09  Hgb 12.7, hct 39.2, mcv 77, normal B12 and folate, iron overload see note  7/10 hgb 12,hct 37  11/10 hgb 12.4,hct 37.5  9/11 hgb 12.6,hct 37.6,mcv 75  4/12 hgb 12,hct 37.5  7/12 hgb 11.9,hct 37,mcv 75  10/29/12 hgb 12.3,hct 36.2,mcv 73;iron 146,ferritin 1367,%sat 52,ESR 18, B12 383,folate 13; SPEP gamma globulin 1.6, no M-spike noted; FIT ordered  4/1/13 hgb 13,hct 39,mcv 75; iron 122,ferritin 1312,%sat 45  1/27/14 hgb 11.8,hct 36,mcv 73,iron 157,ferritin 1267,%sat 55; pt declines referral evaluate liver biopsy  6/2/14 hgb 11.2,hct 33.6,mcv 73,iron 165,%sat 63,ferritin 1275, b12 318, folate 11.7,adj retic 1.1%; SPEP negative  11/18/14 hgb 11.5,hct 36,mcv 75,iron 72,%sat 24,ferritin 1229  4/28/15 hgb 11.2,hct 35,mcv 96,iron 178,%sat 67,ferritin 1316  6/25/15 colon per [facility] anastomotic stricture 15 cm, estimated diameter 30 mm repeat 3 years  10/28/15 hgb 11.7,hct 36,mcv 74,iron 180,%sat 64  1/11/16 hgb 11.4,hct 35,mcv 74,iron 21,%sat 8,ferritin 1414,SPEP negative  6/6/16 hgb 11.3,hct 33.8,mcv 74,iron 132,%sat 45,ferritin 1392,b12 337,folate 12,,retic 1.8,SPEP negative  9/10/16 hgb 10.5,hct 31.6,mcv 75  10/11/16 hgb 10.9,hct 33,mcv 75,iron 135,%sat  43,ferritin 1457  10/14/16 FIT negative  4/17/17 hgb 11.3,hct 35,mcv 74.5,iron 222,ferrritin 1441,,b12 362,folate 11,SPEP negative  10/23/17 hgb 11.6,hct 36,mcv 74.9,iron 187,%sat 63, ferritin 1361 likely hemachromatosis declines liver biopsy or phlebotomy  5/21/18 hgb 12.3,hct 38,mcv 74.5,iron 148,%sat 46,ferritin 1566  9/17/18 hgb 11,hct 34,mcv 76 iron 169,%sat 55,ferritin 1451  1/22/19 hgb 11,hct 35,mcv 77,iron 176,%sat 61,ferritin 1460, AST 21,ALT 14  7/22/19 hgb 10,hct 32,mcv 74,iron 151,%sat 60,ferritin 1443,,retic 1.4,b12 486,folate 85  9/12/19  hematology note iron overload, microcytic anemia, 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, discontinue alcohol, hemoglobin electrophoresis  9/12/19 hemoglobin electrophoresis, hemoglobin A 191.8% (95.0 to 97.9%), hemoglobin 8 to 5.2% (2.0 to 3.5%), hemoglobin of 3.0% (0.0 to 2.1%, impression elevated hemoglobin A2 is slightly high hemoglobin F, elevated hemoglobin A2 can be seen in beta thalassemia trait, and hemoglobin F can be seen and acquired conditions  10/8/19 colonoscopy per [facility] evidence of previous end-to-end end-to-side colocolonic anastomosis distal sigmoid colon, stage I hemorrhoids, repeat 5 years     Adenoma  9/26/07 colon per [facility] negative mixed adenoma polyp  7/2/14 colon per [facility] tubular adenoma x 3 and tubulovillous adenoma, repeat colonoscopy 6 months  7/28/14  surgery procedure note robotic low anterior resection for unresectable lesion by colonoscopy  6/25/15 colon per [facility] anastomotic stricture 15 cm, estimated diameter 30 mm repeat 3 years  10/8/19 colonoscopy per [facility] evidence of previous end-to-end end-to-side colocolonic anastomosis distal sigmoid colon, stage I hemorrhoids, repeat 5 years               Patient Active Problem List   Diagnosis   • Dyslipidemia   • Beta thalassemia trait   • Preventative health care   • Iron overload   • Diabetes mellitus type 2, diet-controlled (HCC)   • BPH (benign prostatic  hypertrophy)   • Adenomatous colon polyp   • Arthritis of knee   • History of tobacco abuse   • Hemoglobinopathy (HCC)          Health Maintenance Summary                IMM ZOSTER VACCINES Overdue 12/24/2012      Done 10/29/2012 Imm Admin: Zoster Vaccine Live (ZVL) (Zostavax)    URINE ACR / MICROALBUMIN Overdue 4/17/2018      Done 4/17/2017 MICROALBUMIN CREAT RATIO URINE     Patient has more history with this topic...    DIABETES MONOFILAMENT / LE EXAM Overdue 4/17/2018      Done 4/17/2017 AMB DIABETIC MONOFILAMENT LOWER EXTREMITY EXAM     Patient has more history with this topic...    Annual Wellness Visit Overdue 10/24/2018      Done 10/23/2017 Visit Dx: Medicare annual wellness visit, subsequent     Patient has more history with this topic...    IMM INFLUENZA Overdue 9/1/2019      Done 9/17/2018 Imm Admin: Influenza Vaccine Adult HD     Patient has more history with this topic...    A1C SCREENING Next Due 1/22/2020      Done 7/22/2019 HEMOGLOBIN A1C     Patient has more history with this topic...    FASTING LIPID PROFILE Next Due 7/22/2020      Done 7/22/2019 LIPID PROFILE     Patient has more history with this topic...    SERUM CREATININE Next Due 7/22/2020      Done 7/22/2019 COMP METABOLIC PANEL     Patient has more history with this topic...    RETINAL SCREENING Next Due 8/22/2020      Done 8/22/2019 REFERRAL FOR RETINAL SCREENING EXAM     Patient has more history with this topic...    IMM DTaP/Tdap/Td Vaccine Next Due 7/23/2022      Done 7/23/2012 Imm Admin: Tdap Vaccine    COLONOSCOPY Next Due 10/8/2024      Done 10/8/2019 REFERRAL TO GI FOR COLONOSCOPY     Patient has more history with this topic...          Patient Care Team:  Thomas Greenberg M.D. as PCP - General  Mariana Adhikari O.D. as Consulting Physician (Optometry)  River FIGUEROA M.D. (Inactive) as Consulting Physician (Gastroenterology)  Massimo Zamudio M.D. as Consulting Physician (Surgery)      ROS       Objective:     /62 (BP Location:  "Right arm, Patient Position: Sitting, BP Cuff Size: Adult)   Pulse 76   Temp 36.4 °C (97.5 °F)   Ht 1.651 m (5' 5\")   Wt 64 kg (141 lb)   SpO2 97%   BMI 23.46 kg/m²      Physical Exam  Vitals signs and nursing note reviewed.   Constitutional:       General: He is not in acute distress.     Appearance: Normal appearance. He is not ill-appearing.   HENT:      Head: Normocephalic and atraumatic.      Mouth/Throat:      Mouth: Mucous membranes are moist.   Eyes:      General:         Right eye: No discharge.         Left eye: No discharge.      Conjunctiva/sclera: Conjunctivae normal.   Cardiovascular:      Rate and Rhythm: Normal rate and regular rhythm.      Heart sounds: No murmur.   Pulmonary:      Effort: No respiratory distress.      Breath sounds: Normal breath sounds.   Abdominal:      General: There is no distension.   Musculoskeletal:         General: No swelling.   Skin:     General: Skin is warm.   Neurological:      General: No focal deficit present.      Mental Status: He is alert.   Psychiatric:         Mood and Affect: Mood normal.         Thought Content: Thought content normal.            Monofilament testing with a 10 gram force: sensation intact: intact  Visual Inspection: Feet without  maceration, ulcers, fissures.  Pedal pulses: intact       Assessment/Plan:       Assessment  #1 diabetes mellitus most recent A1c in July 7 0.2% on metformin 500 mg twice daily, patient declines to check blood sugars at home, has been limiting sodas, no retinopathy, neuropathy, nephropathy most recent eye exam in August    #2 dyslipidemia stable on pravastatin no muscle pain or muscle aches related to statin therapy    #3 beta thalassemia trait seen by hematology oncology with chronic anemia, chronic elevated ferritin level, has declined liver biopsy, hematology oncology recommended avoidance of alcohol patient still drinks 1 beer per day    #4 iron overload      Plan  #1 repeat labs    #2 avoid alcohol    #3 " check feet daily    #4 walk daily 30 minutes    #5 minimize soda and sweets    #6 patient will be establishing with a new primary care provider next year    #7 influenza vaccine high dose today    #8 nutrition, diet, exercise discussed with patient and wife    #9 follow-up with the PCP, can follow-up with myself in the interim

## 2019-12-03 ENCOUNTER — TELEPHONE (OUTPATIENT)
Dept: MEDICAL GROUP | Facility: MEDICAL CENTER | Age: 79
End: 2019-12-03

## 2019-12-03 LAB
EST. AVERAGE GLUCOSE BLD GHB EST-MCNC: 151 MG/DL
HBA1C MFR BLD: 6.9 % (ref 0–5.6)

## 2019-12-03 NOTE — RESULT ENCOUNTER NOTE
Left a message for patient to call back at [phone].     Caron Bautista  [facility] Assistant

## 2019-12-03 NOTE — TELEPHONE ENCOUNTER
Called patient and left message, please notify her that the 's test shows:  (1) the diabetes blood sugar test is better at 6.9%, previously it was 7.2%, have him continue the medication no changes  (2) the cholesterol is excellent at 150 total, good cholesterol is 50 (goal is above 40), bad cholesterol is 78 (goal is less than 100), have him continue the pravastatin cholesterol pill  (3) the liver and kidney function test are normal  (4) the iron levels are improved  (5) the urine test shows a small amount of protein but that is not significant, have him continue the medications no changes

## 2019-12-04 ENCOUNTER — TELEPHONE (OUTPATIENT)
Dept: MEDICAL GROUP | Facility: MEDICAL CENTER | Age: 79
End: 2019-12-04

## 2019-12-04 NOTE — LETTER
December 10, 2019        Jane Valentin  [address] [place] [address]        Dear Jane:    Please be advised that our office has tried to reach you by phone several times, without success.    Please note that Dr. Greenberg had this message for you:      ----- Message from Thomas Greenberg M.D. sent at 12/3/2019  2:34 PM PST -----  Called patient and left message, please notify her that the 's test shows:  (1) the diabetes blood sugar test is better at 6.9%, previously it was 7.2%, have him continue the medication no changes  (2) the cholesterol is excellent at 150 total, good cholesterol is 50 (goal is above 40), bad cholesterol is 78 (goal is less than 100), have him continue the pravastatin cholesterol pill  (3) the liver and kidney function test are normal  (4) the iron levels are improved  (5) the urine test shows a small amount of protein but that is not significant, have him continue the medications no changes    If you have any questions or concerns, please don't hesitate to call.        Sincerely,        Miladys Trevizo, Med Ass't      Electronically Signed

## 2019-12-04 NOTE — TELEPHONE ENCOUNTER
----- Message from Thomas Greenberg M.D. sent at 12/3/2019  2:34 PM PST -----  Called patient and left message, please notify her that the 's test shows:  (1) the diabetes blood sugar test is better at 6.9%, previously it was 7.2%, have him continue the medication no changes  (2) the cholesterol is excellent at 150 total, good cholesterol is 50 (goal is above 40), bad cholesterol is 78 (goal is less than 100), have him continue the pravastatin cholesterol pill  (3) the liver and kidney function test are normal  (4) the iron levels are improved  (5) the urine test shows a small amount of protein but that is not significant, have him continue the medications no changes

## 2021-01-11 DIAGNOSIS — Z23 NEED FOR VACCINATION: ICD-10-CM

## 2022-05-25 ENCOUNTER — PRE-ADMISSION TESTING (OUTPATIENT)
Dept: ADMISSIONS | Facility: MEDICAL CENTER | Age: 82
End: 2022-05-25
Attending: INTERNAL MEDICINE
Payer: MEDICARE

## 2022-05-25 DIAGNOSIS — Z01.810 PRE-OPERATIVE CARDIOVASCULAR EXAMINATION: ICD-10-CM

## 2022-05-25 DIAGNOSIS — Z01.812 PRE-OPERATIVE LABORATORY EXAMINATION: ICD-10-CM

## 2022-05-25 LAB
ANION GAP SERPL CALC-SCNC: 12 MMOL/L (ref 7–16)
BUN SERPL-MCNC: 24 MG/DL (ref 8–22)
CALCIUM SERPL-MCNC: 9.2 MG/DL (ref 8.4–10.2)
CHLORIDE SERPL-SCNC: 103 MMOL/L (ref 96–112)
CO2 SERPL-SCNC: 22 MMOL/L (ref 20–33)
CREAT SERPL-MCNC: 1.11 MG/DL (ref 0.5–1.4)
EKG IMPRESSION: NORMAL
ERYTHROCYTE [DISTWIDTH] IN BLOOD BY AUTOMATED COUNT: 44.3 FL (ref 35.9–50)
GFR SERPLBLD CREATININE-BSD FMLA CKD-EPI: 66 ML/MIN/1.73 M 2
GLUCOSE SERPL-MCNC: 121 MG/DL (ref 65–99)
HCT VFR BLD AUTO: 33.7 % (ref 42–52)
HGB BLD-MCNC: 11 G/DL (ref 14–18)
MCH RBC QN AUTO: 24.6 PG (ref 27–33)
MCHC RBC AUTO-ENTMCNC: 32.6 G/DL (ref 33.7–35.3)
MCV RBC AUTO: 75.2 FL (ref 81.4–97.8)
PLATELET # BLD AUTO: 228 K/UL (ref 164–446)
PMV BLD AUTO: 10 FL (ref 9–12.9)
POTASSIUM SERPL-SCNC: 4.5 MMOL/L (ref 3.6–5.5)
RBC # BLD AUTO: 4.48 M/UL (ref 4.7–6.1)
SODIUM SERPL-SCNC: 137 MMOL/L (ref 135–145)
WBC # BLD AUTO: 4.6 K/UL (ref 4.8–10.8)

## 2022-05-25 PROCEDURE — 93005 ELECTROCARDIOGRAM TRACING: CPT

## 2022-05-25 PROCEDURE — 93010 ELECTROCARDIOGRAM REPORT: CPT | Performed by: INTERNAL MEDICINE

## 2022-05-25 PROCEDURE — 80048 BASIC METABOLIC PNL TOTAL CA: CPT

## 2022-05-25 PROCEDURE — 85027 COMPLETE CBC AUTOMATED: CPT

## 2022-05-25 PROCEDURE — 83036 HEMOGLOBIN GLYCOSYLATED A1C: CPT

## 2022-05-25 PROCEDURE — 36415 COLL VENOUS BLD VENIPUNCTURE: CPT

## 2022-05-25 NOTE — PREPROCEDURE INSTRUCTIONS
Pt presents with wife translating today, declines our . Hx and meds reviewed, pre op instructions given, handouts reviewed and emailed, questions answered.  Pt instructed to continue regularly prescribed medications through day before surgery.Per anesthesia protocol pt instructed to take these medications with a sip of water the day of surgery-none. Instructed to hold am dose of metformin, pt verbalizes understanding.    Anesthesia fasting guidelines reviewed with pt. Pt aware to continue to wear a mask.

## 2022-05-26 ENCOUNTER — APPOINTMENT (OUTPATIENT)
Dept: ADMISSIONS | Facility: MEDICAL CENTER | Age: 82
End: 2022-05-26
Payer: MEDICARE

## 2022-05-26 LAB
EST. AVERAGE GLUCOSE BLD GHB EST-MCNC: 134 MG/DL
HBA1C MFR BLD: 6.3 % (ref 4–5.6)

## 2022-06-01 ENCOUNTER — APPOINTMENT (OUTPATIENT)
Dept: RADIOLOGY | Facility: MEDICAL CENTER | Age: 82
End: 2022-06-01
Attending: INTERNAL MEDICINE
Payer: MEDICARE

## 2022-06-01 ENCOUNTER — ANESTHESIA (OUTPATIENT)
Dept: SURGERY | Facility: MEDICAL CENTER | Age: 82
End: 2022-06-01
Payer: MEDICARE

## 2022-06-01 ENCOUNTER — HOSPITAL ENCOUNTER (OUTPATIENT)
Facility: MEDICAL CENTER | Age: 82
End: 2022-06-01
Attending: INTERNAL MEDICINE | Admitting: INTERNAL MEDICINE
Payer: MEDICARE

## 2022-06-01 ENCOUNTER — ANESTHESIA EVENT (OUTPATIENT)
Dept: SURGERY | Facility: MEDICAL CENTER | Age: 82
End: 2022-06-01
Payer: MEDICARE

## 2022-06-01 VITALS
DIASTOLIC BLOOD PRESSURE: 61 MMHG | SYSTOLIC BLOOD PRESSURE: 137 MMHG | TEMPERATURE: 97.2 F | WEIGHT: 134.92 LBS | HEIGHT: 66 IN | HEART RATE: 71 BPM | BODY MASS INDEX: 21.68 KG/M2 | OXYGEN SATURATION: 93 % | RESPIRATION RATE: 16 BRPM

## 2022-06-01 LAB
GLUCOSE BLD STRIP.AUTO-MCNC: 135 MG/DL (ref 65–99)
PATHOLOGY CONSULT NOTE: NORMAL

## 2022-06-01 PROCEDURE — 700105 HCHG RX REV CODE 258: Performed by: ANESTHESIOLOGY

## 2022-06-01 PROCEDURE — 74018 RADEX ABDOMEN 1 VIEW: CPT

## 2022-06-01 PROCEDURE — 88312 SPECIAL STAINS GROUP 1: CPT

## 2022-06-01 PROCEDURE — 160036 HCHG PACU - EA ADDL 30 MINS PHASE I: Performed by: INTERNAL MEDICINE

## 2022-06-01 PROCEDURE — 88305 TISSUE EXAM BY PATHOLOGIST: CPT

## 2022-06-01 PROCEDURE — 160203 HCHG ENDO MINUTES - 1ST 30 MINS LEVEL 4: Performed by: INTERNAL MEDICINE

## 2022-06-01 PROCEDURE — 160208 HCHG ENDO MINUTES - EA ADDL 1 MIN LEVEL 4: Performed by: INTERNAL MEDICINE

## 2022-06-01 PROCEDURE — 99100 ANES PT EXTEME AGE<1 YR&>70: CPT | Performed by: ANESTHESIOLOGY

## 2022-06-01 PROCEDURE — 160048 HCHG OR STATISTICAL LEVEL 1-5: Performed by: INTERNAL MEDICINE

## 2022-06-01 PROCEDURE — 160035 HCHG PACU - 1ST 60 MINS PHASE I: Performed by: INTERNAL MEDICINE

## 2022-06-01 PROCEDURE — 110371 HCHG SHELL REV 272: Performed by: INTERNAL MEDICINE

## 2022-06-01 PROCEDURE — 700101 HCHG RX REV CODE 250: Performed by: ANESTHESIOLOGY

## 2022-06-01 PROCEDURE — 160025 RECOVERY II MINUTES (STATS): Performed by: INTERNAL MEDICINE

## 2022-06-01 PROCEDURE — 160002 HCHG RECOVERY MINUTES (STAT): Performed by: INTERNAL MEDICINE

## 2022-06-01 PROCEDURE — 160046 HCHG PACU - 1ST 60 MINS PHASE II: Performed by: INTERNAL MEDICINE

## 2022-06-01 PROCEDURE — 700111 HCHG RX REV CODE 636 W/ 250 OVERRIDE (IP): Performed by: ANESTHESIOLOGY

## 2022-06-01 PROCEDURE — 00732 ANES UPR GI NDSC PX ERCP: CPT | Performed by: ANESTHESIOLOGY

## 2022-06-01 PROCEDURE — 82962 GLUCOSE BLOOD TEST: CPT

## 2022-06-01 PROCEDURE — 160009 HCHG ANES TIME/MIN: Performed by: INTERNAL MEDICINE

## 2022-06-01 RX ORDER — SODIUM CHLORIDE, SODIUM LACTATE, POTASSIUM CHLORIDE, CALCIUM CHLORIDE 600; 310; 30; 20 MG/100ML; MG/100ML; MG/100ML; MG/100ML
INJECTION, SOLUTION INTRAVENOUS
Status: DISCONTINUED | OUTPATIENT
Start: 2022-06-01 | End: 2022-06-01 | Stop reason: SURG

## 2022-06-01 RX ORDER — DIPHENHYDRAMINE HYDROCHLORIDE 50 MG/ML
12.5 INJECTION INTRAMUSCULAR; INTRAVENOUS
Status: DISCONTINUED | OUTPATIENT
Start: 2022-06-01 | End: 2022-06-01 | Stop reason: HOSPADM

## 2022-06-01 RX ORDER — OXYCODONE HCL 5 MG/5 ML
10 SOLUTION, ORAL ORAL
Status: DISCONTINUED | OUTPATIENT
Start: 2022-06-01 | End: 2022-06-01 | Stop reason: HOSPADM

## 2022-06-01 RX ORDER — ONDANSETRON 2 MG/ML
4 INJECTION INTRAMUSCULAR; INTRAVENOUS
Status: DISCONTINUED | OUTPATIENT
Start: 2022-06-01 | End: 2022-06-01 | Stop reason: HOSPADM

## 2022-06-01 RX ORDER — OXYCODONE HCL 5 MG/5 ML
5 SOLUTION, ORAL ORAL
Status: DISCONTINUED | OUTPATIENT
Start: 2022-06-01 | End: 2022-06-01 | Stop reason: HOSPADM

## 2022-06-01 RX ORDER — SODIUM CHLORIDE, SODIUM LACTATE, POTASSIUM CHLORIDE, CALCIUM CHLORIDE 600; 310; 30; 20 MG/100ML; MG/100ML; MG/100ML; MG/100ML
INJECTION, SOLUTION INTRAVENOUS CONTINUOUS
Status: DISCONTINUED | OUTPATIENT
Start: 2022-06-01 | End: 2022-06-01 | Stop reason: HOSPADM

## 2022-06-01 RX ORDER — HYDROMORPHONE HYDROCHLORIDE 1 MG/ML
0.2 INJECTION, SOLUTION INTRAMUSCULAR; INTRAVENOUS; SUBCUTANEOUS
Status: DISCONTINUED | OUTPATIENT
Start: 2022-06-01 | End: 2022-06-01 | Stop reason: HOSPADM

## 2022-06-01 RX ORDER — KETOROLAC TROMETHAMINE 30 MG/ML
INJECTION, SOLUTION INTRAMUSCULAR; INTRAVENOUS PRN
Status: DISCONTINUED | OUTPATIENT
Start: 2022-06-01 | End: 2022-06-01 | Stop reason: SURG

## 2022-06-01 RX ORDER — HYDROMORPHONE HYDROCHLORIDE 1 MG/ML
0.1 INJECTION, SOLUTION INTRAMUSCULAR; INTRAVENOUS; SUBCUTANEOUS
Status: DISCONTINUED | OUTPATIENT
Start: 2022-06-01 | End: 2022-06-01 | Stop reason: HOSPADM

## 2022-06-01 RX ORDER — HYDROMORPHONE HYDROCHLORIDE 1 MG/ML
0.5 INJECTION, SOLUTION INTRAMUSCULAR; INTRAVENOUS; SUBCUTANEOUS
Status: DISCONTINUED | OUTPATIENT
Start: 2022-06-01 | End: 2022-06-01 | Stop reason: HOSPADM

## 2022-06-01 RX ORDER — ONDANSETRON 2 MG/ML
INJECTION INTRAMUSCULAR; INTRAVENOUS PRN
Status: DISCONTINUED | OUTPATIENT
Start: 2022-06-01 | End: 2022-06-01 | Stop reason: SURG

## 2022-06-01 RX ORDER — IPRATROPIUM BROMIDE AND ALBUTEROL SULFATE 2.5; .5 MG/3ML; MG/3ML
3 SOLUTION RESPIRATORY (INHALATION)
Status: DISCONTINUED | OUTPATIENT
Start: 2022-06-01 | End: 2022-06-01 | Stop reason: HOSPADM

## 2022-06-01 RX ORDER — DEXAMETHASONE SODIUM PHOSPHATE 4 MG/ML
INJECTION, SOLUTION INTRA-ARTICULAR; INTRALESIONAL; INTRAMUSCULAR; INTRAVENOUS; SOFT TISSUE PRN
Status: DISCONTINUED | OUTPATIENT
Start: 2022-06-01 | End: 2022-06-01 | Stop reason: SURG

## 2022-06-01 RX ORDER — MEPERIDINE HYDROCHLORIDE 25 MG/ML
25 INJECTION INTRAMUSCULAR; INTRAVENOUS; SUBCUTANEOUS
Status: DISCONTINUED | OUTPATIENT
Start: 2022-06-01 | End: 2022-06-01 | Stop reason: HOSPADM

## 2022-06-01 RX ORDER — MIDAZOLAM HYDROCHLORIDE 1 MG/ML
1 INJECTION INTRAMUSCULAR; INTRAVENOUS
Status: DISCONTINUED | OUTPATIENT
Start: 2022-06-01 | End: 2022-06-01 | Stop reason: HOSPADM

## 2022-06-01 RX ADMIN — ROCURONIUM BROMIDE 30 MG: 10 INJECTION INTRAVENOUS at 13:10

## 2022-06-01 RX ADMIN — SUGAMMADEX 120 MG: 100 INJECTION, SOLUTION INTRAVENOUS at 13:39

## 2022-06-01 RX ADMIN — DEXAMETHASONE SODIUM PHOSPHATE 4 MG: 4 INJECTION, SOLUTION INTRAMUSCULAR; INTRAVENOUS at 13:23

## 2022-06-01 RX ADMIN — SODIUM CHLORIDE, POTASSIUM CHLORIDE, SODIUM LACTATE AND CALCIUM CHLORIDE: 600; 310; 30; 20 INJECTION, SOLUTION INTRAVENOUS at 13:05

## 2022-06-01 RX ADMIN — PROPOFOL 110 MG: 10 INJECTION, EMULSION INTRAVENOUS at 13:10

## 2022-06-01 RX ADMIN — ONDANSETRON 4 MG: 2 INJECTION INTRAMUSCULAR; INTRAVENOUS at 13:23

## 2022-06-01 RX ADMIN — KETOROLAC TROMETHAMINE 15 MG: 30 INJECTION, SOLUTION INTRAMUSCULAR at 13:23

## 2022-06-01 RX ADMIN — MIDAZOLAM HYDROCHLORIDE 2 MG: 1 INJECTION, SOLUTION INTRAMUSCULAR; INTRAVENOUS at 13:05

## 2022-06-01 RX ADMIN — FENTANYL CITRATE 100 MCG: 50 INJECTION, SOLUTION INTRAMUSCULAR; INTRAVENOUS at 13:10

## 2022-06-01 ASSESSMENT — PAIN SCALES - GENERAL: PAIN_LEVEL: 2

## 2022-06-01 NOTE — ANESTHESIA POSTPROCEDURE EVALUATION
Patient: Jane Valentin    Procedure Summary     Date: 06/01/22 Room / Location:  ENDOSCOPIC ULTRASOUND ROOM / SURGERY [facility]    Anesthesia Start: 1305 Anesthesia Stop: 1357    Procedures:       GASTROSCOPY (N/A )      EGD, WITH BIOPSY, WITH ENDOSCOPIC US - UPPER RADIAL PANCREATICOBILIARY (N/A )      ERCP, WITH SPHINCTEROTOMY - POSSIBLE WITH BILIARY DUCT STONES EXTRACTION (N/A ) Diagnosis: (DILATED CBD, ACQUIRED)    Surgeons: Keshav Novak M.D. Responsible Provider: Ethan Lu M.D.    Anesthesia Type: general ASA Status: 3          Final Anesthesia Type: general  Last vitals  BP   Blood Pressure : (!) 153/66    Temp   36.2 °C (97.2 °F)    Pulse   93   Resp   18    SpO2   97 %      Anesthesia Post Evaluation    Patient location during evaluation: PACU  Patient participation: complete - patient participated  Level of consciousness: awake and alert  Pain score: 2    Airway patency: patent  Anesthetic complications: no  Cardiovascular status: hemodynamically stable  Respiratory status: acceptable  Hydration status: euvolemic    PONV: none          No complications documented.     Nurse Pain Score: 0 (NPRS)

## 2022-06-01 NOTE — OR NURSING
1353 - Pt arrived from Endo, report received from RN/anesthesia, VSS    1400 - Pt arousable, denies pain/nausea, VSS    1415 - Dr Novak at bedside, VSS, pt denies any pain/ nausea    1430 - Pt resting comfortably, no complaints of pain/nausea, wife updated via phone     1445 - Pt sitting up, sipping water, no complaints of pain/nausea    1500 - Pt meets criteria for transfer to phase II, report called to Luisa BECK     1505 - Pt had b/p 192/80, called Dr Lu anesthesia to report.  No new orders received, per Dr Lu pt ok to transfer to stage II.    1510 - Pt transferred to stage II via [facility] with willi

## 2022-06-01 NOTE — DISCHARGE INSTRUCTIONS
ENDOSCOPY HOME CARE INSTRUCTIONS      GASTROSCOPY OR ERCP  1. Don't eat or drink anything for about an hour after the test. You can then resume your regular diet.  2. Don't drive or drink alcohol for 24 hours. The medication you received will make you too drowsy.  3. Don't take any coffee, tea, or aspirin products until after you see your doctor. These can harm the lining of your stomach.  4. If you begin to vomit bloody material, or develop black or bloody stools, call your doctor as soon as possible.  5. If you have any neck, chest, abdominal pain or temp of 100 degrees, call your doctor.  6. See your doctor in office  7. Additional instructions:    Avoid aspirin, NSAIDs (e.g. Ibuprofen, naprosyn, etc.) and fish oil for 5days.  Patient not to drive, operate heavy machinery or make important decisions for 24 hours.  Please confirm that patient has a responsible adult /chaparone to accompany patient.  Discontinue or remove IVs, EKG leads and driver if applicable.  [facility] office will call for follow-up with Dr. Jasso.    Results to convey to patient: bile duct stones removed after performing sphincterotomy or widening the opening or drainage of bile ducts (tubes draining the liver).    You should call 911 if you develop problems with breathing or chest pain.  If any questions arise, call your doctor. If your doctor is not available, please feel free to call [phone]. You can also call the HEALTH HOTLINE open 24 hours/day, 7 days/week and speak to a nurse at [phone], or toll free [phone].    Depression / Suicide Risk    As you are discharged from this [facility] facility, it is important to learn how to keep safe from harming yourself.    Recognize the warning signs:  Abrupt changes in personality, positive or negative- including increase in energy   Giving away possessions  Change in eating patterns- significant weight changes-  positive or negative  Change in sleeping patterns-  unable to sleep or sleeping all the time   Unwillingness or inability to communicate  Depression  Unusual sadness, discouragement and loneliness  Talk of wanting to die  Neglect of personal appearance   Rebelliousness- reckless behavior  Withdrawal from people/activities they love  Confusion- inability to concentrate     If you or a loved one observes any of these behaviors or has concerns about self-harm, here's what you can do:  Talk about it- your feelings and reasons for harming yourself  Remove any means that you might use to hurt yourself (examples: pills, rope, extension cords, firearm)  Get professional help from the community (Mental Health, Substance Abuse, psychological counseling)  Do not be alone:Call your Safe Contact- someone whom you trust who will be there for you.  Call your local CRISIS HOTLINE 232-7461 or [phone]  Call your local Children's Mobile Crisis Response Team [place] (699) 618-5540 or www.GOGETMi / ?????.??  Call the toll free National Suicide Prevention Hotlines   National Suicide Prevention Lifeline 864-518-QDSH (2879)  [place] [facility] Line Network 800-SUICIDE (505-8108)    I acknowledge receipt and understanding of these Home Care Instructions.

## 2022-06-01 NOTE — ANESTHESIA TIME REPORT
Anesthesia Start and Stop Event Times     Date Time Event    6/1/2022 1243 Ready for Procedure     1305 Anesthesia Start     1357 Anesthesia Stop        Responsible Staff  06/01/22    Name Role Begin End    Ethan Lu M.D. Anesth 1305 1352        Overtime Reason:  no overtime (within assigned shift)    Comments:

## 2022-06-01 NOTE — H&P
"Physician H&P    Patient ID:  Jane Valentin  [id number]  81 y.o. male  1940    History:  Primary Diagnosis: dilated CBD    HPI:  Dilated bile duct, possible common bile duct stone.  Also has anemia of chronic disease and liver steatosis.    Past Medical History:  has a past medical history of Anemia (06/03/2009), Arthritis, Cataract (2018), Dental disorder, Diabetes (HCC), Dyslipidemia (06/03/2009), Erectile dysfunction (06/03/2009), High cholesterol, Impaired fasting blood sugar (09/01/2009), Iron overload (06/08/2009), MEDICAL HOME, and Preventative health care (06/03/2009).  Past Surgical History:  has a past surgical history that includes other (07/21/2014); low anterior resection robotic (07/28/2014); and cataract extraction with iol (Bilateral, 2018).  Past Social History:  reports that he quit smoking about 26 years ago. His smoking use included cigarettes. He has a 12.50 pack-year smoking history. He has never used smokeless tobacco. He reports current alcohol use of about 4.8 oz of alcohol per week. He reports that he does not use drugs.  Past Family History: History reviewed. No pertinent family history.  Allergies: Patient has no known allergies.    Current Medications:  Prior to Admission medications    Medication Sig Start Date End Date Taking? Authorizing Provider   metFORMIN (GLUCOPHAGE) 500 MG Tab Take 1 Tab by mouth 2 times a day, with meals. 6/28/19   Thomas Greenberg M.D.   pravastatin (PRAVACHOL) 10 MG Tab Take 1 Tab by mouth every day. 6/28/19   Thomas Greenberg M.D.       Review of Systems:  ROS  BP (!) 153/66   Pulse 93   Temp 36.2 °C (97.2 °F) (Temporal)   Resp 18   Ht 1.676 m (5' 6\")   Wt 61.2 kg (134 lb 14.7 oz)   SpO2 97%     Physical Examination:  Physical Exam  Vitals and nursing note reviewed. Exam conducted with a chaperone present.   Constitutional:       General: He is not in acute distress.     Appearance: Normal appearance. He is not toxic-appearing or diaphoretic. "   HENT:      Head: Normocephalic and atraumatic.      Mouth/Throat:      Mouth: Mucous membranes are moist.      Pharynx: Oropharynx is clear.   Eyes:      General: No scleral icterus.     Extraocular Movements: Extraocular movements intact.   Abdominal:      General: Abdomen is flat. Bowel sounds are normal. There is no distension.      Tenderness: There is no abdominal tenderness.   Musculoskeletal:      Right lower leg: No edema.      Left lower leg: No edema.   Skin:     Coloration: Skin is not jaundiced.   Neurological:      General: No focal deficit present.      Mental Status: He is alert. Mental status is at baseline.         Impression:  Dilated common bile duct    Plan:  Patient seen and examined before proceeding with EGD biopsies, EUS possible ERCP sphincterotomy bile duct stones extraction under fluoroscopy with possible temporary stent. Risks, benefits, and alternatives of aforementioned procedures were discussed with patient in detail before proceeding.  Patient was given opportunities to ask questions and discuss other options.  Risks including but not limited to pancreatitis, contrast reaction, radiation exposure, stent if placed patient responsibility to schedule outpatient ERCP in 3 months or less, perforation, infection, bleeding, missed lesion(s), possible need for surgery(ies) and/or interventional radiology, possible need for repeat procedure(s) and/or additional testing, hospitalization possibly prolonged, cardiac and/or pulmonary event, aspiration, hypoxia, stroke, medication (s) and/or anesthesia reaction(s), indefinite diagnosis, discomfort/pain, unsuccessful and/or incomplete procedure, ineffective therapy, persistent symptoms, damage to adjacent organs/structure and/or vascular, and other adverse event(s) possibly life-threatening.  Interactive discussion was undertaken with Layman's terms.  I answered questions in full and to satisfaction.  I gave opportunity to cancel, delay and/or  reschedule if not completely comfortable with proceeding.  Patient stated understanding and acceptance of these risks, and wished to proceed.   Informed consent was given in clear state of mind and paper permit was confirmed to have been signed before proceeding.      Keshav Novak M.D.  6/1/2022

## 2022-06-01 NOTE — ANESTHESIA PREPROCEDURE EVALUATION
Case: [id number] Date/Time: 06/01/22 1245    Procedures:       GASTROSCOPY      EGD, WITH BIOPSY, WITH ENDOSCOPIC US - UPPER RADIAL PANCREATICOBILIARY      ERCP, WITH SPHINCTEROTOMY - POSSIBLE WITH BILIARY DUCT STONES EXTRACTION    Pre-op diagnosis: DILATED CBD, ACQUIRED    Location:  ENDOSCOPIC ULTRASOUND ROOM / SURGERY [facility]    Surgeons: Keshav Novak M.D.          Relevant Problems   Other   (positive) Arthritis of knee       Physical Exam    Airway   Mallampati: II  TM distance: >3 FB  Neck ROM: full       Cardiovascular - normal exam  Rhythm: regular  Rate: normal  (-) murmur     Dental - normal exam           Pulmonary - normal exam  Breath sounds clear to auscultation     Abdominal    Neurological - normal exam                 Anesthesia Plan    ASA 3       Plan - general       Airway plan will be ETT          Induction: intravenous    Postoperative Plan: Postoperative administration of opioids is intended.    Pertinent diagnostic labs and testing reviewed    Informed Consent:    Anesthetic plan and risks discussed with patient.    Use of blood products discussed with: patient whom consented to blood products.

## 2022-06-01 NOTE — PROCEDURES
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    Consent: Patient seen and examined before proceeding. Risks, benefits, and alternatives of aforementioned procedures were discussed with patient in detail before proceeding.  Patient was given opportunities to ask questions and discuss other options.  Risks including but not limited to pancreatitis, contrast reaction, radiation exposure, stent if placed patient responsibility to schedule outpatient ERCP in 3 months or less, perforation, infection, bleeding, missed lesion(s), possible need for surgery(ies) and/or interventional radiology, possible need for repeat procedure(s) and/or additional testing, hospitalization possibly prolonged, cardiac and/or pulmonary event, aspiration, hypoxia, stroke, medication (s) and/or anesthesia reaction(s), indefinite diagnosis, discomfort/pain, unsuccessful and/or incomplete procedure, ineffective therapy, persistent symptoms, damage to adjacent organs/structure and/or vascular, and other adverse event(s) possibly life-threatening.  Interactive discussion was undertaken with Layman's terms.  I answered questions in full and to satisfaction.  I gave opportunity to cancel, delay and/or reschedule if not completely comfortable with proceeding.  Patient stated understanding and acceptance of these risks, and  "wished to proceed.   Informed consent was given in clear state of mind and paper permit was confirmed to have been signed before proceeding.    Endoscopic procedures in detail: Diagnostic flexible Olympus endoscope was inserted from mouth into second portion of the duodenum, retroflexion was performed in the stomach.  Gastric biopsies obtained to evaluate H.pylori status. I suctioned insufflated air and stomach fluid contents upon removal.      Radial Olympus flexible echoendoscope was inserted from mouth to second portion of the duodenum.  Pancreas was examined with identification of normal vascular landmarks including superior mesenteric artery, superior mesenteric vein, portal vein, celiac artery, portal vein confluence, and splenorenal junction.  Biliary duct was imaged and traced from intrapancreatic portion to hepatic hilum.  Celiac axis was imaged to look for echogenic lymph nodes.    Olympus side-viewing ERCP flexible duodenoscope was inserted from mouth to 2nd portion of the duodenum.  Biliary duct was selectively cannulated on the first attempt, successful free cannulation was achieved.  Cholangiogram was injected and completed.  Biliary sphincterotomy was performed with a bow papillotome with subsequent balloon common bile duct stones extraction/retrieval, clearance was complete without evidence of any residual common bile duct stones on completion cholangiogram. The C-arm was moved and rotated on rainbow axis to optimize imaging of intrahepatic biliary systems in different angles to avoid missing lesions/strictures with ductal overlap and/or image angulation. The balloon was inflated within the right and left intrahepatic ducts and dragged through the entire length of the bile duct out the biliary os multiple times in order to minimize risk of retained stones. The ERCP scope was removed from duodenum to also image the common bile duct \"behind\" the ERCP scope.  Of note, no radiologist was present to help " obtain nor read ERCP images.  I was the sole physician who obtained and performed interpretation of static and dynamic ERCP fluoroscopic x-ray images, no over-read was requested from the radiologist nor was it necessary.  I suctioned insufflated air and stomach fluid contents upon removal.    Procedure times:  - In-room 13:06  - Start 13:19  - Completed 13:38  - Out of room per nursing records    Esophagogastroduodenoscopy Findings:  - Esophagus: endoscopically unremarkable, no Beltrán's esophagus.   - Stomach: mild non-erosive gastritis, erythema, biopsied to evaluate H.pylori status.   - Duodenum: endoscopically unremarkable to 2nd portion    Endoscopic Ultrasound Findings:  - Pancreas: no evidence of echogenic pancreas mass or tumor.  - Bile duct: dilated with small choledocholithiasis.  - Gallbladder: no stones nor wall thickening.  - Celiac axis: no echogenic lymph nodes.     Endoscopic Retrograde CholangioPancreatography Findings:  - Ampulla of Vater: located in 2nd portion of duodenum, papillary stenosis treated with biliary sphincterotomy.  - Cholangiogram: small choledocholithiasis, balloon extracted, no upstream biliary stricture.  - Pancreatogram: intentionally not injected nor cannulated.  - Therapy: biliary sphincterotomy performed with bile duct stones balloon extraction.\    Impression:  1. Papillary stenosis treated with biliary sphincterotomy  2. Choledocholithiasis, extracted    Recommendations:   1.  Routine post-endoscopy anesthesia recovery care.  Discharge home when recovery criteria are met.  Aspiration and fall precautions x 24 hours.   2.  Follow-up with established GI Dr. Jasso.  3.  I spoke to patient and recovery nurse about impression, diagnosis and recommendations.  I had patient's permission to left a voicemail for wife Jaelyn.  I answered questions in full and to satisfaction

## 2022-06-01 NOTE — ANESTHESIA PROCEDURE NOTES
Airway    Date/Time: 6/1/2022 1:11 PM  Performed by: Ethna Lu M.D.  Authorized by: Ethan Lu M.D.     Location:  OR  Urgency:  Elective  Indications for Airway Management:  Anesthesia      Spontaneous Ventilation: absent    Sedation Level:  Deep  Preoxygenated: Yes    Patient Position:  Sniffing  Final Airway Type:  Endotracheal airway  Final Endotracheal Airway:  ETT  Cuffed: Yes    Technique Used for Successful ETT Placement:  Direct laryngoscopy    Insertion Site:  Oral  Blade Type:  Jw  Laryngoscope Blade/Videolaryngoscope Blade Size:  3  ETT Size (mm):  7.5  Measured from:  Teeth  ETT to Teeth (cm):  25  Placement Verified by: auscultation and capnometry    Cormack-Lehane Classification:  Grade I - full view of glottis  Number of Attempts at Approach:  1

## 2022-11-10 ENCOUNTER — PATIENT MESSAGE (OUTPATIENT)
Dept: HEALTH INFORMATION MANAGEMENT | Facility: OTHER | Age: 82
End: 2022-11-10

## (undated) DEVICE — WATER IRRIGATION STERILE 1000ML (12EA/CA)

## (undated) DEVICE — ELECTRODE DUAL RETURN W/ CORD - (50/PK)

## (undated) DEVICE — KIT ANESTHESIA W/CIRCUIT & 3/LT BAG W/FILTER (20EA/CA)

## (undated) DEVICE — SYRINGE SAFETY 3 ML 18 GA X 1 1/2 BLUNT LL (100/BX 8BX/CA)

## (undated) DEVICE — CATHETER IV SAFETY 20 GA X 1-1/4 (50/BX)

## (undated) DEVICE — GOWN SURGEONS LARGE - (32/CA)

## (undated) DEVICE — CANISTER SUCTION RIGID RED 1500CC (40EA/CA)

## (undated) DEVICE — GLOVE, LITE (PAIR)

## (undated) DEVICE — BITE BLOCK ADULT 60FR (100EA/CA)

## (undated) DEVICE — EXTRACTOR PRO XL 9-12 MM ABOVE

## (undated) DEVICE — SYRINGE DISP. 60 CC LL - (30/BX, 12BX/CA)**WHEN THESE ARE GONE ORDER #500206**

## (undated) DEVICE — SYRINGE SAFETY 10 ML 18 GA X 1 1/2 BLUNT LL (100/BX 4BX/CA)

## (undated) DEVICE — SPONGE GAUZE NON-STERILE 4X4 - (2000/CA 10PK/CA)

## (undated) DEVICE — TUBE E-T HI-LO CUFF 8.0MM (10EA/PK)

## (undated) DEVICE — TUBE E-T HI-LO CUFF 6.5MM (10EA/BX)

## (undated) DEVICE — BLOCK BITE ENDOSCOPIC 2809 - (100/BX) INTERMEDIATE

## (undated) DEVICE — TUBE E-T HI-LO CUFF 7.5MM (10EA/PK)

## (undated) DEVICE — SYRINGE SAFETY 5 ML 18 GA X 1-1/2 BLUNT LL (100/BX 4BX/CA)

## (undated) DEVICE — TUBE E-T HI-LO CUFF 8.5MM (10EA/PK)

## (undated) DEVICE — SPHINCTERTOME TRUETOME 44 20MM PRELOAD JAG 035IN

## (undated) DEVICE — TUBE CONNECTING SUCTION - CLEAR PLASTIC STERILE 72 IN (50EA/CA)

## (undated) DEVICE — LACTATED RINGERS INJ 1000 ML - (14EA/CA 60CA/PF)

## (undated) DEVICE — FORCEP RADIAL JAW 4 STANDARD CAPACITY W/NEEDLE 240CM (40EA/BX)

## (undated) DEVICE — TUBE SUCTION YANKAUER  1/4 X 6FT (20EA/CA)"

## (undated) DEVICE — TOWEL STOP TIMEOUT SAFETY FLAG (40EA/CA)

## (undated) DEVICE — KIT CUSTOM PROCEDURE SINGLE FOR ENDO  (15/CA)

## (undated) DEVICE — KIT  I.V. START (100EA/CA)

## (undated) DEVICE — CATHETER IV SAFETY 22 GA X 1 (50EA/BX)

## (undated) DEVICE — SENSOR SPO2 ADULT LNCS ADTX (20/BX) ORDER ITEM #19593

## (undated) DEVICE — NEPTUNE 4 PORT MANIFOLD - (20/PK)

## (undated) DEVICE — SYRINGE 12 CC LUER TIP - (80/BX) OBSOLETE ITEM

## (undated) DEVICE — MASK WITH FACE SHIELD (25/BX 4BX/CA)

## (undated) DEVICE — TUBE E-T HI-LO CUFF 7.0MM (10EA/PK)

## (undated) DEVICE — TUBING CLEARLINK DUO-VENT - C-FLO (48EA/CA)